# Patient Record
Sex: MALE | Race: WHITE | Employment: UNEMPLOYED | URBAN - METROPOLITAN AREA
[De-identification: names, ages, dates, MRNs, and addresses within clinical notes are randomized per-mention and may not be internally consistent; named-entity substitution may affect disease eponyms.]

---

## 2017-01-13 ENCOUNTER — OFFICE VISIT (OUTPATIENT)
Dept: FAMILY MEDICINE CLINIC | Age: 67
End: 2017-01-13

## 2017-01-13 VITALS
WEIGHT: 247.6 LBS | HEART RATE: 76 BPM | DIASTOLIC BLOOD PRESSURE: 78 MMHG | TEMPERATURE: 98 F | SYSTOLIC BLOOD PRESSURE: 118 MMHG | HEIGHT: 74 IN | RESPIRATION RATE: 12 BRPM | BODY MASS INDEX: 31.78 KG/M2

## 2017-01-13 DIAGNOSIS — J01.00 ACUTE MAXILLARY SINUSITIS, RECURRENCE NOT SPECIFIED: ICD-10-CM

## 2017-01-13 DIAGNOSIS — M15.9 PRIMARY OSTEOARTHRITIS INVOLVING MULTIPLE JOINTS: ICD-10-CM

## 2017-01-13 DIAGNOSIS — J20.9 ACUTE BRONCHITIS, UNSPECIFIED ORGANISM: Primary | ICD-10-CM

## 2017-01-13 DIAGNOSIS — I10 ESSENTIAL HYPERTENSION: ICD-10-CM

## 2017-01-13 PROCEDURE — 3017F COLORECTAL CA SCREEN DOC REV: CPT | Performed by: FAMILY MEDICINE

## 2017-01-13 PROCEDURE — G8427 DOCREV CUR MEDS BY ELIG CLIN: HCPCS | Performed by: FAMILY MEDICINE

## 2017-01-13 PROCEDURE — 1123F ACP DISCUSS/DSCN MKR DOCD: CPT | Performed by: FAMILY MEDICINE

## 2017-01-13 PROCEDURE — G8484 FLU IMMUNIZE NO ADMIN: HCPCS | Performed by: FAMILY MEDICINE

## 2017-01-13 PROCEDURE — 4040F PNEUMOC VAC/ADMIN/RCVD: CPT | Performed by: FAMILY MEDICINE

## 2017-01-13 PROCEDURE — G8419 CALC BMI OUT NRM PARAM NOF/U: HCPCS | Performed by: FAMILY MEDICINE

## 2017-01-13 PROCEDURE — 1036F TOBACCO NON-USER: CPT | Performed by: FAMILY MEDICINE

## 2017-01-13 PROCEDURE — 99213 OFFICE O/P EST LOW 20 MIN: CPT | Performed by: FAMILY MEDICINE

## 2017-01-13 RX ORDER — CEFUROXIME AXETIL 250 MG/1
250 TABLET ORAL 2 TIMES DAILY
Qty: 28 TABLET | Refills: 0 | Status: SHIPPED | OUTPATIENT
Start: 2017-01-13 | End: 2019-03-25 | Stop reason: SDUPTHER

## 2017-01-13 ASSESSMENT — ENCOUNTER SYMPTOMS
SHORTNESS OF BREATH: 0
RHINORRHEA: 1
SORE THROAT: 0
EYES NEGATIVE: 1
CONSTIPATION: 0
SINUS PAIN: 0
VOMITING: 0
COUGH: 1
ABDOMINAL PAIN: 0
WHEEZING: 0
DIARRHEA: 0
NAUSEA: 0
SWOLLEN GLANDS: 0

## 2017-01-13 ASSESSMENT — PATIENT HEALTH QUESTIONNAIRE - PHQ9
SUM OF ALL RESPONSES TO PHQ QUESTIONS 1-9: 0
SUM OF ALL RESPONSES TO PHQ9 QUESTIONS 1 & 2: 0
2. FEELING DOWN, DEPRESSED OR HOPELESS: 0
1. LITTLE INTEREST OR PLEASURE IN DOING THINGS: 0

## 2017-02-04 DIAGNOSIS — E78.5 HYPERLIPIDEMIA: ICD-10-CM

## 2017-02-04 RX ORDER — BENAZEPRIL HYDROCHLORIDE AND HYDROCHLOROTHIAZIDE 20; 12.5 MG/1; MG/1
TABLET ORAL
Qty: 180 TABLET | Refills: 1 | Status: SHIPPED | OUTPATIENT
Start: 2017-02-04 | End: 2017-08-05 | Stop reason: SDUPTHER

## 2017-06-09 ENCOUNTER — OFFICE VISIT (OUTPATIENT)
Dept: FAMILY MEDICINE CLINIC | Age: 67
End: 2017-06-09

## 2017-06-09 VITALS
WEIGHT: 248.6 LBS | TEMPERATURE: 96.8 F | SYSTOLIC BLOOD PRESSURE: 132 MMHG | RESPIRATION RATE: 16 BRPM | HEIGHT: 74 IN | DIASTOLIC BLOOD PRESSURE: 82 MMHG | HEART RATE: 84 BPM | BODY MASS INDEX: 31.9 KG/M2

## 2017-06-09 DIAGNOSIS — S22.32XA CLOSED FRACTURE OF ONE RIB OF LEFT SIDE, INITIAL ENCOUNTER: ICD-10-CM

## 2017-06-09 DIAGNOSIS — Z23 NEED FOR PNEUMOCOCCAL VACCINATION: ICD-10-CM

## 2017-06-09 DIAGNOSIS — R07.81 RIB PAIN ON LEFT SIDE: Primary | ICD-10-CM

## 2017-06-09 DIAGNOSIS — S20.212A CHEST WALL CONTUSION, LEFT, INITIAL ENCOUNTER: ICD-10-CM

## 2017-06-09 DIAGNOSIS — I10 ESSENTIAL HYPERTENSION: ICD-10-CM

## 2017-06-09 PROCEDURE — 90732 PPSV23 VACC 2 YRS+ SUBQ/IM: CPT | Performed by: FAMILY MEDICINE

## 2017-06-09 PROCEDURE — 99213 OFFICE O/P EST LOW 20 MIN: CPT | Performed by: FAMILY MEDICINE

## 2017-06-09 PROCEDURE — G8417 CALC BMI ABV UP PARAM F/U: HCPCS | Performed by: FAMILY MEDICINE

## 2017-06-09 PROCEDURE — 4040F PNEUMOC VAC/ADMIN/RCVD: CPT | Performed by: FAMILY MEDICINE

## 2017-06-09 PROCEDURE — 1036F TOBACCO NON-USER: CPT | Performed by: FAMILY MEDICINE

## 2017-06-09 PROCEDURE — 3017F COLORECTAL CA SCREEN DOC REV: CPT | Performed by: FAMILY MEDICINE

## 2017-06-09 PROCEDURE — G8427 DOCREV CUR MEDS BY ELIG CLIN: HCPCS | Performed by: FAMILY MEDICINE

## 2017-06-09 PROCEDURE — G0009 ADMIN PNEUMOCOCCAL VACCINE: HCPCS | Performed by: FAMILY MEDICINE

## 2017-06-09 PROCEDURE — 1123F ACP DISCUSS/DSCN MKR DOCD: CPT | Performed by: FAMILY MEDICINE

## 2017-06-09 ASSESSMENT — ENCOUNTER SYMPTOMS
CONSTIPATION: 0
DIARRHEA: 0
SHORTNESS OF BREATH: 0
EYES NEGATIVE: 1
NAUSEA: 0
ABDOMINAL PAIN: 0
COUGH: 0

## 2017-07-06 ENCOUNTER — OFFICE VISIT (OUTPATIENT)
Dept: FAMILY MEDICINE CLINIC | Age: 67
End: 2017-07-06

## 2017-07-06 VITALS
HEART RATE: 64 BPM | RESPIRATION RATE: 16 BRPM | SYSTOLIC BLOOD PRESSURE: 124 MMHG | WEIGHT: 249.6 LBS | BODY MASS INDEX: 32.03 KG/M2 | DIASTOLIC BLOOD PRESSURE: 82 MMHG | TEMPERATURE: 97.3 F | HEIGHT: 74 IN

## 2017-07-06 DIAGNOSIS — E78.2 MIXED HYPERLIPIDEMIA: ICD-10-CM

## 2017-07-06 DIAGNOSIS — M15.9 PRIMARY OSTEOARTHRITIS INVOLVING MULTIPLE JOINTS: ICD-10-CM

## 2017-07-06 DIAGNOSIS — S61.219A FINGER LACERATION, INITIAL ENCOUNTER: Primary | ICD-10-CM

## 2017-07-06 DIAGNOSIS — I10 ESSENTIAL HYPERTENSION: ICD-10-CM

## 2017-07-06 DIAGNOSIS — Z12.5 SPECIAL SCREENING FOR MALIGNANT NEOPLASM OF PROSTATE: ICD-10-CM

## 2017-07-06 PROCEDURE — 4040F PNEUMOC VAC/ADMIN/RCVD: CPT | Performed by: FAMILY MEDICINE

## 2017-07-06 PROCEDURE — 1036F TOBACCO NON-USER: CPT | Performed by: FAMILY MEDICINE

## 2017-07-06 PROCEDURE — 1123F ACP DISCUSS/DSCN MKR DOCD: CPT | Performed by: FAMILY MEDICINE

## 2017-07-06 PROCEDURE — 3017F COLORECTAL CA SCREEN DOC REV: CPT | Performed by: FAMILY MEDICINE

## 2017-07-06 PROCEDURE — 99213 OFFICE O/P EST LOW 20 MIN: CPT | Performed by: FAMILY MEDICINE

## 2017-07-06 PROCEDURE — G8427 DOCREV CUR MEDS BY ELIG CLIN: HCPCS | Performed by: FAMILY MEDICINE

## 2017-07-06 PROCEDURE — G8417 CALC BMI ABV UP PARAM F/U: HCPCS | Performed by: FAMILY MEDICINE

## 2017-07-06 RX ORDER — CEPHALEXIN 500 MG/1
CAPSULE ORAL
COMMUNITY
Start: 2017-07-01 | End: 2018-09-24 | Stop reason: ALTCHOICE

## 2017-07-06 ASSESSMENT — ENCOUNTER SYMPTOMS
SORE THROAT: 0
WHEEZING: 0
ABDOMINAL PAIN: 0
SHORTNESS OF BREATH: 0
VOMITING: 0
DIARRHEA: 0
COUGH: 0
SINUS PRESSURE: 0
NAUSEA: 0
CHEST TIGHTNESS: 0
RHINORRHEA: 0

## 2017-07-11 DIAGNOSIS — I10 ESSENTIAL HYPERTENSION: ICD-10-CM

## 2017-07-11 DIAGNOSIS — E78.2 MIXED HYPERLIPIDEMIA: ICD-10-CM

## 2017-07-11 DIAGNOSIS — Z12.5 SPECIAL SCREENING FOR MALIGNANT NEOPLASM OF PROSTATE: ICD-10-CM

## 2017-07-11 LAB
ALBUMIN SERPL-MCNC: 4.1 G/DL (ref 3.9–4.9)
ALP BLD-CCNC: 79 U/L (ref 35–104)
ALT SERPL-CCNC: 24 U/L (ref 0–41)
ANION GAP SERPL CALCULATED.3IONS-SCNC: 12 MEQ/L (ref 7–13)
AST SERPL-CCNC: 26 U/L (ref 0–40)
BASOPHILS ABSOLUTE: 0.1 K/UL (ref 0–0.2)
BASOPHILS RELATIVE PERCENT: 1.3 %
BILIRUB SERPL-MCNC: 0.4 MG/DL (ref 0–1.2)
BUN BLDV-MCNC: 15 MG/DL (ref 8–23)
CALCIUM SERPL-MCNC: 9.2 MG/DL (ref 8.6–10.2)
CHLORIDE BLD-SCNC: 100 MEQ/L (ref 98–107)
CHOLESTEROL, TOTAL: 204 MG/DL (ref 0–199)
CO2: 25 MEQ/L (ref 22–29)
CREAT SERPL-MCNC: 0.97 MG/DL (ref 0.7–1.2)
EOSINOPHILS ABSOLUTE: 0.1 K/UL (ref 0–0.7)
EOSINOPHILS RELATIVE PERCENT: 1.9 %
GFR AFRICAN AMERICAN: >60
GFR NON-AFRICAN AMERICAN: >60
GLOBULIN: 2.7 G/DL (ref 2.3–3.5)
GLUCOSE BLD-MCNC: 123 MG/DL (ref 74–109)
HCT VFR BLD CALC: 53 % (ref 42–52)
HDLC SERPL-MCNC: 44 MG/DL (ref 40–59)
HEMOGLOBIN: 16.9 G/DL (ref 14–18)
LDL CHOLESTEROL CALCULATED: 134 MG/DL (ref 0–129)
LYMPHOCYTES ABSOLUTE: 0.9 K/UL (ref 1–4.8)
LYMPHOCYTES RELATIVE PERCENT: 12.2 %
MCH RBC QN AUTO: 29.3 PG (ref 27–31.3)
MCHC RBC AUTO-ENTMCNC: 31.9 % (ref 33–37)
MCV RBC AUTO: 91.8 FL (ref 80–100)
MONOCYTES ABSOLUTE: 0.5 K/UL (ref 0.2–0.8)
MONOCYTES RELATIVE PERCENT: 7 %
NEUTROPHILS ABSOLUTE: 5.9 K/UL (ref 1.4–6.5)
NEUTROPHILS RELATIVE PERCENT: 77.6 %
PDW BLD-RTO: 16.4 % (ref 11.5–14.5)
PLATELET # BLD: 287 K/UL (ref 130–400)
POTASSIUM SERPL-SCNC: 4 MEQ/L (ref 3.5–5.1)
PROSTATE SPECIFIC ANTIGEN: 0.88 NG/ML (ref 0–5.4)
RBC # BLD: 5.78 M/UL (ref 4.7–6.1)
SODIUM BLD-SCNC: 137 MEQ/L (ref 132–144)
TOTAL PROTEIN: 6.8 G/DL (ref 6.4–8.1)
TRIGL SERPL-MCNC: 128 MG/DL (ref 0–200)
WBC # BLD: 7.7 K/UL (ref 4.8–10.8)

## 2017-08-05 DIAGNOSIS — E78.5 HYPERLIPIDEMIA: ICD-10-CM

## 2017-08-07 RX ORDER — BENAZEPRIL HYDROCHLORIDE AND HYDROCHLOROTHIAZIDE 20; 12.5 MG/1; MG/1
TABLET ORAL
Qty: 180 TABLET | Refills: 0 | Status: SHIPPED | OUTPATIENT
Start: 2017-08-07 | End: 2017-11-05 | Stop reason: SDUPTHER

## 2017-09-12 ENCOUNTER — OFFICE VISIT (OUTPATIENT)
Dept: FAMILY MEDICINE CLINIC | Age: 67
End: 2017-09-12

## 2017-09-12 VITALS
RESPIRATION RATE: 16 BRPM | HEIGHT: 74 IN | SYSTOLIC BLOOD PRESSURE: 114 MMHG | BODY MASS INDEX: 31.44 KG/M2 | HEART RATE: 64 BPM | DIASTOLIC BLOOD PRESSURE: 82 MMHG | TEMPERATURE: 96.3 F | WEIGHT: 245 LBS

## 2017-09-12 DIAGNOSIS — J20.9 ACUTE BRONCHITIS, UNSPECIFIED ORGANISM: Primary | ICD-10-CM

## 2017-09-12 DIAGNOSIS — I10 ESSENTIAL HYPERTENSION: ICD-10-CM

## 2017-09-12 DIAGNOSIS — J06.9 ACUTE UPPER RESPIRATORY INFECTION: ICD-10-CM

## 2017-09-12 PROCEDURE — 1036F TOBACCO NON-USER: CPT | Performed by: FAMILY MEDICINE

## 2017-09-12 PROCEDURE — 99213 OFFICE O/P EST LOW 20 MIN: CPT | Performed by: FAMILY MEDICINE

## 2017-09-12 PROCEDURE — 1123F ACP DISCUSS/DSCN MKR DOCD: CPT | Performed by: FAMILY MEDICINE

## 2017-09-12 PROCEDURE — 4040F PNEUMOC VAC/ADMIN/RCVD: CPT | Performed by: FAMILY MEDICINE

## 2017-09-12 PROCEDURE — 3017F COLORECTAL CA SCREEN DOC REV: CPT | Performed by: FAMILY MEDICINE

## 2017-09-12 PROCEDURE — G8417 CALC BMI ABV UP PARAM F/U: HCPCS | Performed by: FAMILY MEDICINE

## 2017-09-12 PROCEDURE — G8427 DOCREV CUR MEDS BY ELIG CLIN: HCPCS | Performed by: FAMILY MEDICINE

## 2017-09-12 RX ORDER — LEVOFLOXACIN 500 MG/1
500 TABLET, FILM COATED ORAL DAILY
Qty: 14 TABLET | Refills: 0 | Status: SHIPPED | OUTPATIENT
Start: 2017-09-12 | End: 2017-09-26

## 2017-09-12 ASSESSMENT — ENCOUNTER SYMPTOMS
SHORTNESS OF BREATH: 0
COUGH: 1
CONSTIPATION: 0
NAUSEA: 0
ABDOMINAL PAIN: 0
EYES NEGATIVE: 1
DIARRHEA: 0
SINUS PRESSURE: 1

## 2017-11-05 DIAGNOSIS — E78.5 HYPERLIPIDEMIA: ICD-10-CM

## 2017-11-06 RX ORDER — BENAZEPRIL HYDROCHLORIDE AND HYDROCHLOROTHIAZIDE 20; 12.5 MG/1; MG/1
TABLET ORAL
Qty: 180 TABLET | Refills: 1 | Status: SHIPPED | OUTPATIENT
Start: 2017-11-06 | End: 2018-05-05 | Stop reason: SDUPTHER

## 2018-05-05 DIAGNOSIS — E78.5 HYPERLIPIDEMIA: ICD-10-CM

## 2018-05-07 RX ORDER — BENAZEPRIL HYDROCHLORIDE AND HYDROCHLOROTHIAZIDE 20; 12.5 MG/1; MG/1
TABLET ORAL
Qty: 180 TABLET | Refills: 0 | Status: SHIPPED | OUTPATIENT
Start: 2018-05-07 | End: 2018-09-24 | Stop reason: SDUPTHER

## 2018-08-31 LAB
CHOLESTEROL, TOTAL: 137 MG/DL
CHOLESTEROL/HDL RATIO: NORMAL
HDLC SERPL-MCNC: 39 MG/DL (ref 35–70)
LDL CHOLESTEROL CALCULATED: 84 MG/DL (ref 0–160)
PROSTATE SPECIFIC ANTIGEN: 1.4 NG/ML
TRIGL SERPL-MCNC: 68 MG/DL
VLDLC SERPL CALC-MCNC: NORMAL MG/DL

## 2018-09-24 ENCOUNTER — OFFICE VISIT (OUTPATIENT)
Dept: FAMILY MEDICINE CLINIC | Age: 68
End: 2018-09-24
Payer: MEDICARE

## 2018-09-24 VITALS
SYSTOLIC BLOOD PRESSURE: 138 MMHG | BODY MASS INDEX: 30.01 KG/M2 | WEIGHT: 233.8 LBS | RESPIRATION RATE: 12 BRPM | HEART RATE: 63 BPM | TEMPERATURE: 96.7 F | DIASTOLIC BLOOD PRESSURE: 86 MMHG | HEIGHT: 74 IN

## 2018-09-24 DIAGNOSIS — M77.9 TENDINITIS: ICD-10-CM

## 2018-09-24 DIAGNOSIS — I10 ESSENTIAL HYPERTENSION: Primary | ICD-10-CM

## 2018-09-24 DIAGNOSIS — Z12.5 SPECIAL SCREENING FOR MALIGNANT NEOPLASM OF PROSTATE: ICD-10-CM

## 2018-09-24 DIAGNOSIS — Z23 NEED FOR SHINGLES VACCINE: ICD-10-CM

## 2018-09-24 DIAGNOSIS — I10 ESSENTIAL HYPERTENSION: ICD-10-CM

## 2018-09-24 DIAGNOSIS — E78.2 MIXED HYPERLIPIDEMIA: ICD-10-CM

## 2018-09-24 DIAGNOSIS — E78.00 PURE HYPERCHOLESTEROLEMIA: ICD-10-CM

## 2018-09-24 LAB
ALBUMIN SERPL-MCNC: 4 G/DL (ref 3.9–4.9)
ALP BLD-CCNC: 83 U/L (ref 35–104)
ALT SERPL-CCNC: 23 U/L (ref 0–41)
ANION GAP SERPL CALCULATED.3IONS-SCNC: 16 MEQ/L (ref 7–13)
AST SERPL-CCNC: 27 U/L (ref 0–40)
BASOPHILS ABSOLUTE: 0.1 K/UL (ref 0–0.2)
BASOPHILS RELATIVE PERCENT: 0.5 %
BILIRUB SERPL-MCNC: 0.7 MG/DL (ref 0–1.2)
BUN BLDV-MCNC: 15 MG/DL (ref 8–23)
CALCIUM SERPL-MCNC: 9.5 MG/DL (ref 8.6–10.2)
CHLORIDE BLD-SCNC: 96 MEQ/L (ref 98–107)
CO2: 27 MEQ/L (ref 22–29)
CREAT SERPL-MCNC: 1.02 MG/DL (ref 0.7–1.2)
EOSINOPHILS ABSOLUTE: 0.1 K/UL (ref 0–0.7)
EOSINOPHILS RELATIVE PERCENT: 1.3 %
GFR AFRICAN AMERICAN: >60
GFR NON-AFRICAN AMERICAN: >60
GLOBULIN: 3.1 G/DL (ref 2.3–3.5)
GLUCOSE BLD-MCNC: 107 MG/DL (ref 74–109)
HCT VFR BLD CALC: 61.3 % (ref 42–52)
HEMOGLOBIN: 20.6 G/DL (ref 14–18)
LYMPHOCYTES ABSOLUTE: 0.6 K/UL (ref 1–4.8)
LYMPHOCYTES RELATIVE PERCENT: 7 %
MCH RBC QN AUTO: 31.1 PG (ref 27–31.3)
MCHC RBC AUTO-ENTMCNC: 33.5 % (ref 33–37)
MCV RBC AUTO: 92.7 FL (ref 80–100)
MONOCYTES ABSOLUTE: 0.7 K/UL (ref 0.2–0.8)
MONOCYTES RELATIVE PERCENT: 7.6 %
NEUTROPHILS ABSOLUTE: 7.7 K/UL (ref 1.4–6.5)
NEUTROPHILS RELATIVE PERCENT: 83.6 %
PDW BLD-RTO: 18.1 % (ref 11.5–14.5)
PLATELET # BLD: 253 K/UL (ref 130–400)
POTASSIUM SERPL-SCNC: 3.9 MEQ/L (ref 3.5–5.1)
RBC # BLD: 6.61 M/UL (ref 4.7–6.1)
SODIUM BLD-SCNC: 139 MEQ/L (ref 132–144)
TOTAL PROTEIN: 7.1 G/DL (ref 6.4–8.1)
WBC # BLD: 9.3 K/UL (ref 4.8–10.8)

## 2018-09-24 PROCEDURE — 99214 OFFICE O/P EST MOD 30 MIN: CPT | Performed by: FAMILY MEDICINE

## 2018-09-24 RX ORDER — BENAZEPRIL HYDROCHLORIDE AND HYDROCHLOROTHIAZIDE 20; 12.5 MG/1; MG/1
TABLET ORAL
Qty: 180 TABLET | Refills: 3 | Status: SHIPPED | OUTPATIENT
Start: 2018-09-24 | End: 2018-10-01 | Stop reason: SDUPTHER

## 2018-09-24 ASSESSMENT — PATIENT HEALTH QUESTIONNAIRE - PHQ9
1. LITTLE INTEREST OR PLEASURE IN DOING THINGS: 0
SUM OF ALL RESPONSES TO PHQ9 QUESTIONS 1 & 2: 0
2. FEELING DOWN, DEPRESSED OR HOPELESS: 0
SUM OF ALL RESPONSES TO PHQ QUESTIONS 1-9: 0
SUM OF ALL RESPONSES TO PHQ QUESTIONS 1-9: 0

## 2018-09-24 ASSESSMENT — ENCOUNTER SYMPTOMS
COUGH: 0
SHORTNESS OF BREATH: 0
NAUSEA: 0
DIARRHEA: 0
CONSTIPATION: 0
ABDOMINAL PAIN: 0
EYES NEGATIVE: 1

## 2018-09-24 NOTE — PROGRESS NOTES
Subjective  Talyor Hendrickson, 76 y.o. male presents today with:  Chief Complaint   Patient presents with    Hypertension         Arthritis         Wrist Pain                HPI     Patient presents today for a follow-up on Hypertension. Tries to follow a low sodium diet. Is fasting for blood work. Also complaining of ongoing shoulder pain. It has worsened over the past 2 months. Denies injury, or swelling. Has not tried any medication for it. Thinks it's Arthritis. Denies any fall       States he has a vein in his left wrist that becomes tender, and swollen. It is waxing, and waning. Present for 6 months. Not there most of the time. It is not there now      Had [employer] screening done which was reviewed with the patient  PSA and lipids were all okay. All her tests were okay  Taking current medications which were reviewed. Problem list discussed. Eating much better. Losing weight. Says he feels so much better. Overall doing well. Has No other new problem / question. Health Maintenance Is Up-To-Date          No other questions and or concerns for today's visit      Review of Systems   Constitutional: Negative for activity change, appetite change, fatigue and fever. HENT: Negative for ear pain. Eyes: Negative. Respiratory: Negative for cough and shortness of breath. Cardiovascular: Negative for chest pain and palpitations. Gastrointestinal: Negative for abdominal pain, constipation, diarrhea and nausea. Genitourinary: Negative for dysuria and frequency. Neurological: Negative for dizziness and headaches.          Past Medical History:   Diagnosis Date    Eczema     Hyperlipidemia     Hypertension     Osteoarthritis     Overweight(278.02)      Past Surgical History:   Procedure Laterality Date    COLONOSCOPY  10/15/15    DR HOLLIS   5 YRS    KNEE SURGERY      left    TONSILLECTOMY       Social History     Social History    Marital status:      Spouse name: [id number] Number of children: 3    Years of education: N/A     Occupational History     Retired     Social History Main Topics    Smoking status: Never Smoker    Smokeless tobacco: Never Used    Alcohol use No    Drug use: No    Sexual activity: Not on file     Other Topics Concern    Not on file     Social History Narrative    No narrative on file     Family History   Problem Relation Age of Onset    Heart Attack Father      Allergies   Allergen Reactions    Bee Venom Shortness Of Breath     Throat swells     Current Outpatient Prescriptions   Medication Sig Dispense Refill    benazepril-hydrochlorthiazide (LOTENSIN HCT) 20-12.5 MG per tablet TAKE 1 TABLET TWICE A  tablet 3    zoster recombinant adjuvanted vaccine (SHINGRIX) 50 MCG SUSR injection Inject 0.5 mLs into the muscle once for 1 dose 0.5 mL 1    Handicap Placard MISC by Does not apply route Expires x 5 years 07/06/2022 1 each 0    Multiple Vitamin (THERA) TABS Take  by mouth. No current facility-administered medications for this visit. PMH, Surgical Hx, Family Hx, and Social Hx reviewed and updated. Health Maintenance reviewed. Objective    Vitals:    09/24/18 0833   BP: 138/86   Pulse: 63   Resp: 12   Temp: 96.7 °F (35.9 °C)   TempSrc: Temporal   Weight: 233 lb 12.8 oz (106.1 kg)   Height: 6' 2\" (1.88 m)     Physical Exam   Constitutional: He appears well-nourished. No distress. HENT:   Head: Normocephalic. Right Ear: External ear normal.   Left Ear: External ear normal.   Nose: Nose normal.   Mouth/Throat: Oropharynx is clear and moist.   Eyes: Pupils are equal, round, and reactive to light. Conjunctivae are normal.   Neck: Neck supple. Carotid bruit is not present. No thyromegaly present. Cardiovascular: Normal rate, regular rhythm, normal heart sounds and normal pulses. No murmur heard. Pulmonary/Chest: Effort normal and breath sounds normal. He has no wheezes. Abdominal: Soft.  Bowel

## 2018-09-24 NOTE — PATIENT INSTRUCTIONS
Hypertension / Hyperlipidemia Management    Blood Pressure Log      Tips to manage your condition    1. Keep your blood pressure below 130/80   Make sure your blood pressure is measured at every office visit    2. If you take antihypertensive drug therapy return for follow-up monthly until B/P goal is reached. 3. Follow-up with your physician to have your potassium and creatinine measured every 6 months. 4. Measure your blood pressure at home (use log to track your progress). 5. Keep your LDL (bad) cholesterol level below 130   Make sure your lipids are measured once a year     6. If you take LDL-lowering drug therapy return for follow-up every 6 months    Tips for healthy living    1. Start your day with breakfast  2. Exercise and slowly progress to (brisk walking, bike riding, etc) 30 minutes 3 to 5 days a week. 3. Snack in moderation (limit eating sugary or salty foods to no more than 3 times a week). 4. Eat more grains and vegetables (have no more than 3 servings of fruit daily). 5. Avoid tobacco use. 6. Drink alcohol in moderation (no more than 1 serving daily for woman and no more than 2 servings daily for men)  7. Obtain annual flu shot  8. Refer to patient education handouts given to you today. [facility] Address Phone Website   [facility]  Dept. [address] [place] [address], [address] [place] [address] [phone] [email or web address]       Weight Management Community Resources   Organization Address Phone Website   [facility] [address] [facility], [address] [place] [address] [phone] n/a   [employer] Multiple meeting locations throughout [address] [email or web address]     Tops n/a n/a [email or web address]. [email or web address] [address] [place]  Laila Harish Chopra, [address] [phone] [email or web address]    [facility] [address] [place] [address] [facility] [phone]

## 2018-09-27 ENCOUNTER — TELEPHONE (OUTPATIENT)
Dept: FAMILY MEDICINE CLINIC | Age: 68
End: 2018-09-27

## 2018-09-27 DIAGNOSIS — D58.2 ELEVATED HEMOGLOBIN (HCC): Primary | ICD-10-CM

## 2018-10-01 ENCOUNTER — TELEPHONE (OUTPATIENT)
Dept: FAMILY MEDICINE CLINIC | Age: 68
End: 2018-10-01

## 2018-10-01 DIAGNOSIS — D58.2 ELEVATED HEMOGLOBIN (HCC): ICD-10-CM

## 2018-10-01 DIAGNOSIS — E78.00 PURE HYPERCHOLESTEROLEMIA: ICD-10-CM

## 2018-10-01 LAB
ANISOCYTOSIS: ABNORMAL
BASOPHILS ABSOLUTE: 0.1 K/UL (ref 0–0.2)
BASOPHILS RELATIVE PERCENT: 0.7 %
EOSINOPHILS ABSOLUTE: 0.1 K/UL (ref 0–0.7)
EOSINOPHILS RELATIVE PERCENT: 1.5 %
HCT VFR BLD CALC: 56.4 % (ref 42–52)
HEMOGLOBIN: 18.6 G/DL (ref 14–18)
LYMPHOCYTES ABSOLUTE: 0.7 K/UL (ref 1–4.8)
LYMPHOCYTES RELATIVE PERCENT: 8 %
MCH RBC QN AUTO: 30.9 PG (ref 27–31.3)
MCHC RBC AUTO-ENTMCNC: 33 % (ref 33–37)
MCV RBC AUTO: 93.7 FL (ref 80–100)
MONOCYTES ABSOLUTE: 0.6 K/UL (ref 0.2–0.8)
MONOCYTES RELATIVE PERCENT: 6.5 %
NEUTROPHILS ABSOLUTE: 7.1 K/UL (ref 1.4–6.5)
NEUTROPHILS RELATIVE PERCENT: 83.3 %
PDW BLD-RTO: 18 % (ref 11.5–14.5)
PLATELET # BLD: 235 K/UL (ref 130–400)
POIKILOCYTES: ABNORMAL
RBC # BLD: 6.02 M/UL (ref 4.7–6.1)
WBC # BLD: 8.5 K/UL (ref 4.8–10.8)

## 2018-10-01 RX ORDER — BENAZEPRIL HYDROCHLORIDE AND HYDROCHLOROTHIAZIDE 20; 12.5 MG/1; MG/1
TABLET ORAL
Qty: 180 TABLET | Refills: 0 | Status: SHIPPED | OUTPATIENT
Start: 2018-10-01 | End: 2018-10-08 | Stop reason: SDUPTHER

## 2018-10-01 NOTE — TELEPHONE ENCOUNTER
States that he would like to have the medication sent to [facility]  Medication is pending

## 2018-10-08 DIAGNOSIS — E78.00 PURE HYPERCHOLESTEROLEMIA: ICD-10-CM

## 2018-10-08 RX ORDER — BENAZEPRIL HYDROCHLORIDE AND HYDROCHLOROTHIAZIDE 20; 12.5 MG/1; MG/1
TABLET ORAL
Qty: 180 TABLET | Refills: 0 | Status: SHIPPED | OUTPATIENT
Start: 2018-10-08

## 2019-03-25 ENCOUNTER — OFFICE VISIT (OUTPATIENT)
Dept: FAMILY MEDICINE CLINIC | Age: 69
End: 2019-03-25
Payer: MEDICARE

## 2019-03-25 VITALS
TEMPERATURE: 97.4 F | BODY MASS INDEX: 31.06 KG/M2 | DIASTOLIC BLOOD PRESSURE: 72 MMHG | HEART RATE: 56 BPM | WEIGHT: 242 LBS | RESPIRATION RATE: 12 BRPM | HEIGHT: 74 IN | SYSTOLIC BLOOD PRESSURE: 108 MMHG

## 2019-03-25 DIAGNOSIS — H65.92 SEROMUCINOUS OTITIS MEDIA OF LEFT EAR: Primary | ICD-10-CM

## 2019-03-25 DIAGNOSIS — D75.1 POLYCYTHEMIA: ICD-10-CM

## 2019-03-25 PROCEDURE — 99213 OFFICE O/P EST LOW 20 MIN: CPT | Performed by: FAMILY MEDICINE

## 2019-03-25 RX ORDER — CEFUROXIME AXETIL 250 MG/1
250 TABLET ORAL 2 TIMES DAILY
Qty: 14 TABLET | Refills: 0 | Status: SHIPPED | OUTPATIENT
Start: 2019-03-25 | End: 2019-04-01

## 2019-03-25 ASSESSMENT — ENCOUNTER SYMPTOMS
ABDOMINAL PAIN: 0
NAUSEA: 0
EYES NEGATIVE: 1
SHORTNESS OF BREATH: 0
COUGH: 0
CONSTIPATION: 0
DIARRHEA: 0

## 2019-03-25 ASSESSMENT — PATIENT HEALTH QUESTIONNAIRE - PHQ9
2. FEELING DOWN, DEPRESSED OR HOPELESS: 1
SUM OF ALL RESPONSES TO PHQ QUESTIONS 1-9: 2
SUM OF ALL RESPONSES TO PHQ QUESTIONS 1-9: 2
1. LITTLE INTEREST OR PLEASURE IN DOING THINGS: 1
SUM OF ALL RESPONSES TO PHQ9 QUESTIONS 1 & 2: 2

## 2021-05-23 ENCOUNTER — OFFICE VISIT (OUTPATIENT)
Dept: INTERNAL MEDICINE | Age: 71
End: 2021-05-23
Payer: MEDICARE

## 2021-05-23 VITALS
BODY MASS INDEX: 29.8 KG/M2 | HEIGHT: 74 IN | HEART RATE: 86 BPM | TEMPERATURE: 97.2 F | SYSTOLIC BLOOD PRESSURE: 100 MMHG | DIASTOLIC BLOOD PRESSURE: 64 MMHG | WEIGHT: 232.2 LBS | OXYGEN SATURATION: 98 %

## 2021-05-23 DIAGNOSIS — T16.2XXA FOREIGN BODY OF LEFT EAR, INITIAL ENCOUNTER: Primary | ICD-10-CM

## 2021-05-23 PROCEDURE — 10120 INC&RMVL FB SUBQ TISS SMPL: CPT | Performed by: NURSE PRACTITIONER

## 2021-05-23 PROCEDURE — 99203 OFFICE O/P NEW LOW 30 MIN: CPT | Performed by: NURSE PRACTITIONER

## 2021-05-23 ASSESSMENT — ENCOUNTER SYMPTOMS
EYE ITCHING: 0
WHEEZING: 0
SINUS PRESSURE: 0
RHINORRHEA: 0
VOMITING: 0
EYE REDNESS: 0
DIARRHEA: 0
EYE DISCHARGE: 0
COUGH: 0
SHORTNESS OF BREATH: 0
NAUSEA: 0

## 2021-05-23 ASSESSMENT — PATIENT HEALTH QUESTIONNAIRE - PHQ9
SUM OF ALL RESPONSES TO PHQ QUESTIONS 1-9: 0
SUM OF ALL RESPONSES TO PHQ QUESTIONS 1-9: 0

## 2021-05-23 ASSESSMENT — SOCIAL DETERMINANTS OF HEALTH (SDOH): HOW HARD IS IT FOR YOU TO PAY FOR THE VERY BASICS LIKE FOOD, HOUSING, MEDICAL CARE, AND HEATING?: NOT HARD AT ALL

## 2021-05-23 NOTE — PROGRESS NOTES
moist.      Pharynx: Oropharynx is clear. No posterior oropharyngeal erythema. Cardiovascular:      Rate and Rhythm: Normal rate and regular rhythm. Heart sounds: Normal heart sounds, S1 normal and S2 normal.   Pulmonary:      Effort: Pulmonary effort is normal. No respiratory distress. Breath sounds: Normal air entry. Skin:     General: Skin is warm and dry. Neurological:      Mental Status: He is alert and oriented to person, place, and time. Psychiatric:         Mood and Affect: Mood normal.         Behavior: Behavior is cooperative. An electronic signature was used to authenticate this note.     --TROY Pike
Implemented All Fall with Harm Risk Interventions:  [place] to call system. Call bell, personal items and telephone within reach. Instruct patient to call for assistance. Room bathroom lighting operational. Non-slip footwear when patient is off stretcher. Physically safe environment: no spills, clutter or unnecessary equipment. Stretcher in lowest position, wheels locked, appropriate side rails in place. Provide visual cue, wrist band, yellow gown, etc. Monitor gait and stability. Monitor for mental status changes and reorient to person, place, and time. Review medications for side effects contributing to fall risk. Reinforce activity limits and safety measures with patient and family. Provide visual clues: red socks.

## 2021-05-23 NOTE — PATIENT INSTRUCTIONS
important to keep the liquid in the ear canal for 3 to 5 minutes. · You can put heat on the ear to relieve pain. Use a warm washcloth or a heating pad set on low. · Do not put cotton swabs, jane pins, or other objects in the ear. Do not put any liquids in the ear, unless your doctor directs you to. When should you call for help? Call your doctor now or seek immediate medical care if:    · You have symptoms of an ear infection, such as:  ? You have new or worse pain, swelling, warmth, or redness around or behind your ear.  ? You have a fever with a stiff neck or severe headache. Watch closely for changes in your health, and be sure to contact your doctor if:    · You are not getting better after 2 days (48 hours).     · You have new or worse symptoms. Where can you learn more? Go to https://NeovacspeGEOCOMtms.[employer]. org and sign in to your [employer] account. Enter C171 in the [employer] box to learn more about \"Object in the Ear: Care Instructions. \"     If you do not have an account, please click on the \"Sign Up Now\" link. Current as of: February 26, 2020               Content Version: 12.8  © 2006-2021 Healthwise, [employer]. Care instructions adapted under license by [facility] [address] If you have questions about a medical condition or this instruction, always ask your healthcare professional. [place] [address] any warranty or liability for your use of this information.

## 2023-02-21 ENCOUNTER — OFFICE VISIT (OUTPATIENT)
Dept: FAMILY MEDICINE CLINIC | Age: 73
End: 2023-02-21
Payer: MEDICARE

## 2023-02-21 VITALS
SYSTOLIC BLOOD PRESSURE: 110 MMHG | TEMPERATURE: 97.8 F | HEART RATE: 75 BPM | BODY MASS INDEX: 27.44 KG/M2 | DIASTOLIC BLOOD PRESSURE: 64 MMHG | WEIGHT: 213.8 LBS | HEIGHT: 74 IN | OXYGEN SATURATION: 98 %

## 2023-02-21 DIAGNOSIS — L02.612 ABSCESS OF LEFT FOOT: Primary | ICD-10-CM

## 2023-02-21 PROCEDURE — 1036F TOBACCO NON-USER: CPT

## 2023-02-21 PROCEDURE — G8427 DOCREV CUR MEDS BY ELIG CLIN: HCPCS

## 2023-02-21 PROCEDURE — 1123F ACP DISCUSS/DSCN MKR DOCD: CPT

## 2023-02-21 PROCEDURE — 3078F DIAST BP <80 MM HG: CPT

## 2023-02-21 PROCEDURE — G8417 CALC BMI ABV UP PARAM F/U: HCPCS

## 2023-02-21 PROCEDURE — 3017F COLORECTAL CA SCREEN DOC REV: CPT

## 2023-02-21 PROCEDURE — G8484 FLU IMMUNIZE NO ADMIN: HCPCS

## 2023-02-21 PROCEDURE — 3074F SYST BP LT 130 MM HG: CPT

## 2023-02-21 PROCEDURE — 99213 OFFICE O/P EST LOW 20 MIN: CPT

## 2023-02-21 RX ORDER — LISINOPRIL 20 MG/1
20 TABLET ORAL DAILY
COMMUNITY

## 2023-02-21 RX ORDER — CEPHALEXIN 500 MG/1
500 CAPSULE ORAL 4 TIMES DAILY
Qty: 40 CAPSULE | Refills: 0 | Status: SHIPPED | OUTPATIENT
Start: 2023-02-21 | End: 2023-03-03

## 2023-02-21 RX ORDER — ATORVASTATIN CALCIUM 20 MG/1
TABLET, FILM COATED ORAL
COMMUNITY
Start: 2021-01-06

## 2023-02-21 RX ORDER — HYDROXYUREA 500 MG/1
CAPSULE ORAL DAILY
COMMUNITY

## 2023-02-21 SDOH — ECONOMIC STABILITY: INCOME INSECURITY: HOW HARD IS IT FOR YOU TO PAY FOR THE VERY BASICS LIKE FOOD, HOUSING, MEDICAL CARE, AND HEATING?: NOT HARD AT ALL

## 2023-02-21 SDOH — ECONOMIC STABILITY: FOOD INSECURITY: WITHIN THE PAST 12 MONTHS, THE FOOD YOU BOUGHT JUST DIDN'T LAST AND YOU DIDN'T HAVE MONEY TO GET MORE.: NEVER TRUE

## 2023-02-21 SDOH — ECONOMIC STABILITY: FOOD INSECURITY: WITHIN THE PAST 12 MONTHS, YOU WORRIED THAT YOUR FOOD WOULD RUN OUT BEFORE YOU GOT MONEY TO BUY MORE.: NEVER TRUE

## 2023-02-21 SDOH — ECONOMIC STABILITY: HOUSING INSECURITY
IN THE LAST 12 MONTHS, WAS THERE A TIME WHEN YOU DID NOT HAVE A STEADY PLACE TO SLEEP OR SLEPT IN A SHELTER (INCLUDING NOW)?: NO

## 2023-02-21 ASSESSMENT — ENCOUNTER SYMPTOMS
COLOR CHANGE: 0
SHORTNESS OF BREATH: 0
COUGH: 0

## 2023-02-21 ASSESSMENT — PATIENT HEALTH QUESTIONNAIRE - PHQ9
SUM OF ALL RESPONSES TO PHQ9 QUESTIONS 1 & 2: 1
SUM OF ALL RESPONSES TO PHQ QUESTIONS 1-9: 1
1. LITTLE INTEREST OR PLEASURE IN DOING THINGS: 0
SUM OF ALL RESPONSES TO PHQ QUESTIONS 1-9: 1
SUM OF ALL RESPONSES TO PHQ QUESTIONS 1-9: 1
2. FEELING DOWN, DEPRESSED OR HOPELESS: 1
SUM OF ALL RESPONSES TO PHQ QUESTIONS 1-9: 1

## 2023-02-21 NOTE — PROGRESS NOTES
Subjective  Angie Lau, 67 y.o. male presents today with:  Chief Complaint   Patient presents with    Foot Injury     Ammo can slipped and fell on left foot x 1 week       Foot Injury   The incident occurred 5 to 7 days ago (Can fell on foot one week ago but pt states he is not concerned for a possible fracture. No trouble with ambulation. Pt is concerned about infection to area. States the swelling has caused the top of his foot to rub against shoe causing skin irritation. ). The incident occurred at home. The injury mechanism was a direct blow (Pt states a can fell on his foot one week ago but did not break the skin.). The pain is present in the left foot. Quality: sore. The pain is at a severity of 1/10. The pain is mild. The pain has been Fluctuating since onset. Pertinent negatives include no inability to bear weight, loss of motion, loss of sensation, muscle weakness, numbness or tingling. He reports no foreign bodies present. The symptoms are aggravated by palpation. He has tried elevation, heat and ice for the symptoms. The treatment provided mild relief. Review of Systems   Constitutional:  Negative for chills, fatigue and fever. Respiratory:  Negative for cough and shortness of breath. Cardiovascular:  Negative for chest pain and palpitations. Musculoskeletal:  Positive for myalgias. Negative for arthralgias and joint swelling. Skin:  Positive for wound. Negative for color change and pallor. Neurological:  Negative for dizziness, tingling, weakness, light-headedness, numbness and headaches. PMH, Surgical Hx, Family Hx, and Social Hx reviewed and updated.       Objective  Vitals:    02/21/23 0943   BP: 110/64   Pulse: 75   Temp: 97.8 °F (36.6 °C)   TempSrc: Infrared   SpO2: 98%   Weight: 213 lb 12.8 oz (97 kg)   Height: 6' 2\" (1.88 m)     BP Readings from Last 3 Encounters:   02/21/23 110/64   05/23/21 100/64   03/25/19 108/72     Wt Readings from Last 3 Encounters: 02/21/23 213 lb 12.8 oz (97 kg)   05/23/21 232 lb 3.2 oz (105.3 kg)   03/25/19 242 lb (109.8 kg)     Physical Exam  Vitals reviewed. Exam conducted with a chaperone present (wife with patient). Constitutional:       General: He is not in acute distress. Appearance: Normal appearance. He is not ill-appearing. HENT:      Head: Normocephalic and atraumatic. Eyes:      General: Lids are normal.   Cardiovascular:      Rate and Rhythm: Normal rate and regular rhythm. Pulmonary:      Effort: Pulmonary effort is normal. No respiratory distress. Breath sounds: Normal air entry. Musculoskeletal:      Cervical back: Normal range of motion. Left foot: Normal range of motion. Feet:    Feet:      Left foot:      Skin integrity: Skin breakdown, erythema and warmth present. Skin:     General: Skin is warm and dry. Neurological:      Mental Status: He is alert and oriented to person, place, and time. Mental status is at baseline. Psychiatric:         Mood and Affect: Mood normal.         Behavior: Behavior is cooperative. Assessment & Plan   1. Abscess of left foot  -     cephALEXin (KEFLEX) 500 MG capsule; Take 1 capsule by mouth 4 times daily for 10 days, Disp-40 capsule, R-0Normal  -     mupirocin (BACTROBAN) 2 % ointment; Apply topically 3 times daily for 10 days. , Disp-22 g, R-0, Normal   -Warm compresses 15-30 minutes 3-4 times per day. -Elevate foot whenever possible  -Do not attempt to drain abscess prematurely by squeezing aggressively  -Keep area clean and dry  -OTC medication for symptom control such as tylenol   -Discussed at length when to go to hospital or return to clinic/ see PCP.  Discussed signs and symptoms of worsening infection.  -Discussed red flags for when to go to ER       Orders Placed This Encounter   Medications    cephALEXin (KEFLEX) 500 MG capsule     Sig: Take 1 capsule by mouth 4 times daily for 10 days     Dispense:  40 capsule     Refill:  0    mupirocin (BACTROBAN) 2 % ointment     Sig: Apply topically 3 times daily for 10 days. Dispense:  22 g     Refill:  0     Return in 2 days (on 2/23/2023) for follow up with PCP. Reviewed with the patient: current clinical status,medications, activities and diet. Side effects, adverse effects of the medication prescribed today, as well as treatment plan/ rationale and result expectations have been discussed with the patient who expresses understanding and desires to proceed. Close follow up to evaluate treatment results and for coordination of care. I have reviewed the patient's medical history in detail and updated the computerized patient record.     TROY Dubose NP

## 2024-09-12 ENCOUNTER — OFFICE VISIT (OUTPATIENT)
Dept: ORTHOPEDIC SURGERY | Facility: CLINIC | Age: 74
End: 2024-09-12
Payer: MEDICARE

## 2024-09-12 ENCOUNTER — HOSPITAL ENCOUNTER (OUTPATIENT)
Dept: RADIOLOGY | Facility: CLINIC | Age: 74
Discharge: HOME | End: 2024-09-12
Payer: MEDICARE

## 2024-09-12 VITALS — BODY MASS INDEX: 25.62 KG/M2 | WEIGHT: 199.6 LBS | HEIGHT: 74 IN

## 2024-09-12 DIAGNOSIS — M25.562 LEFT KNEE PAIN, UNSPECIFIED CHRONICITY: ICD-10-CM

## 2024-09-12 PROCEDURE — 1159F MED LIST DOCD IN RCRD: CPT | Performed by: ORTHOPAEDIC SURGERY

## 2024-09-12 PROCEDURE — 99204 OFFICE O/P NEW MOD 45 MIN: CPT | Performed by: ORTHOPAEDIC SURGERY

## 2024-09-12 PROCEDURE — 1036F TOBACCO NON-USER: CPT | Performed by: ORTHOPAEDIC SURGERY

## 2024-09-12 PROCEDURE — 73564 X-RAY EXAM KNEE 4 OR MORE: CPT | Mod: LT

## 2024-09-12 PROCEDURE — 3008F BODY MASS INDEX DOCD: CPT | Performed by: ORTHOPAEDIC SURGERY

## 2024-09-12 PROCEDURE — 99214 OFFICE O/P EST MOD 30 MIN: CPT | Performed by: ORTHOPAEDIC SURGERY

## 2024-09-12 RX ORDER — ATORVASTATIN CALCIUM 40 MG/1
20 TABLET, FILM COATED ORAL
COMMUNITY

## 2024-09-12 RX ORDER — METHYLPREDNISOLONE 4 MG/1
TABLET ORAL
Qty: 21 TABLET | Refills: 0 | Status: SHIPPED | OUTPATIENT
Start: 2024-09-12

## 2024-09-12 RX ORDER — MELOXICAM 15 MG/1
15 TABLET ORAL
Qty: 30 TABLET | Refills: 1 | Status: SHIPPED | OUTPATIENT
Start: 2024-09-12

## 2024-09-12 RX ORDER — VIT A/VIT C/VIT E/ZINC/COPPER 4296-226
1 CAPSULE ORAL 2 TIMES DAILY
COMMUNITY

## 2024-09-12 RX ORDER — LISINOPRIL 20 MG/1
20 TABLET ORAL DAILY
COMMUNITY

## 2024-09-12 RX ORDER — HYDROXYUREA 500 MG/1
CAPSULE ORAL DAILY
COMMUNITY

## 2024-09-12 ASSESSMENT — PATIENT HEALTH QUESTIONNAIRE - PHQ9
1. LITTLE INTEREST OR PLEASURE IN DOING THINGS: NOT AT ALL
SUM OF ALL RESPONSES TO PHQ9 QUESTIONS 1 AND 2: 0
2. FEELING DOWN, DEPRESSED OR HOPELESS: NOT AT ALL

## 2024-09-12 ASSESSMENT — ENCOUNTER SYMPTOMS
LOSS OF SENSATION IN FEET: 0
DEPRESSION: 0
OCCASIONAL FEELINGS OF UNSTEADINESS: 1

## 2024-09-12 NOTE — PROGRESS NOTES
History of Present Illness:  Patient presents with left knee pain.  The patient localizes the pain diffusely.  Recently there has been concern for falls and instability.  There is increasing difficulty with activities of daily living and significant disability related to the knee pain.    He has a history of TKA with garcia, revision with saul and subsequent revision with Helga. He endorses chronic mild to moderate pain and instability worse at the end of the day.    Retired , likes to fish and play put put.      Review of Systems   GENERAL: Negative for malaise, significant weight loss, fever  MUSCULOSKELETAL: see HPI  NEURO:  Negative    History reviewed. No pertinent past medical history.  History reviewed. No pertinent surgical history.    Current Outpatient Medications:     apixaban (Eliquis) 5 mg tablet, Take by mouth 2 times a day., Disp: , Rfl:     atorvastatin (Lipitor) 40 mg tablet, Take 0.5 tablets (20 mg) by mouth once daily., Disp: , Rfl:     hydroxyurea (Hydrea) 500 mg capsule, Take by mouth once daily., Disp: , Rfl:     lisinopril 20 mg tablet, Take 1 tablet (20 mg) by mouth once daily., Disp: , Rfl:     vitamins A,C,E-zinc-copper (PreserVision AREDS) 4,296 mcg-226 mg-90 mg capsule, Take 1 capsule by mouth twice a day., Disp: , Rfl:       Physical Examination:  Left Knee:  Skin healthy and intact, incisions healed  No gross swelling or ecchymosis  Alignment: neutral      Effusion: mild       ROM: 0-95  No pain with internal rotation of the hip  Tenderness to palpation over medial and lateral joint line and with patellar compression     No laxity to valgus stress  No laxity to varus stress  Neurovascular exam normal distally  2+ DP pulse and good cap refill    Radiographs:  Stemmed cemented revision TKA, no evidence of lucency or failure     Assessment:  Patient status post revision TKA at OSH    Plan:  We reviewed a variety of options with the patient.  Regarding the chronic pain related  to multiple procedures discussed creative approaches: neurolysis, blocks, pain management et    Regarding instability likely related to swelling / fatigue.  Discussed PT / home exercise program and MDP and mobic for swelling.    A nonsteroidal anti-inflammatory drug (NSAID) was prescribed.  We reviewed the risks (including gastric issues, renal issues) and benefits of the medication.  We discussed a scheduled course if no adverse reactions to help with swelling / pain.  We discussed that chronic usage should be checked with primary care physician.

## 2024-10-09 RX ORDER — BISMUTH SUBSALICYLATE 262 MG
1 TABLET,CHEWABLE ORAL DAILY
COMMUNITY

## 2024-10-09 NOTE — PREPROCEDURE INSTRUCTIONS
Reviewed pre-op instructions with patient including local anesthesia instructions-  hospital and check in location, and day of surgery routine.

## 2024-10-16 ENCOUNTER — APPOINTMENT (OUTPATIENT)
Dept: RADIOLOGY | Facility: HOSPITAL | Age: 74
End: 2024-10-16
Payer: MEDICARE

## 2024-10-16 ENCOUNTER — HOSPITAL ENCOUNTER (OUTPATIENT)
Facility: HOSPITAL | Age: 74
Setting detail: OUTPATIENT SURGERY
Discharge: HOME | End: 2024-10-16
Attending: ANESTHESIOLOGY | Admitting: ANESTHESIOLOGY
Payer: MEDICARE

## 2024-10-16 VITALS
HEIGHT: 74 IN | HEART RATE: 69 BPM | WEIGHT: 200.84 LBS | DIASTOLIC BLOOD PRESSURE: 76 MMHG | TEMPERATURE: 96.4 F | OXYGEN SATURATION: 96 % | BODY MASS INDEX: 25.78 KG/M2 | SYSTOLIC BLOOD PRESSURE: 135 MMHG | RESPIRATION RATE: 18 BRPM

## 2024-10-16 PROCEDURE — 76998 US GUIDE INTRAOP: CPT

## 2024-10-16 PROCEDURE — 3600000002 HC OR TIME - INITIAL BASE CHARGE - PROCEDURE LEVEL TWO: Performed by: ANESTHESIOLOGY

## 2024-10-16 PROCEDURE — 3600000007 HC OR TIME - EACH INCREMENTAL 1 MINUTE - PROCEDURE LEVEL TWO: Performed by: ANESTHESIOLOGY

## 2024-10-16 PROCEDURE — 7100000010 HC PHASE TWO TIME - EACH INCREMENTAL 1 MINUTE: Performed by: ANESTHESIOLOGY

## 2024-10-16 PROCEDURE — 2500000004 HC RX 250 GENERAL PHARMACY W/ HCPCS (ALT 636 FOR OP/ED): Performed by: ANESTHESIOLOGY

## 2024-10-16 PROCEDURE — 7100000009 HC PHASE TWO TIME - INITIAL BASE CHARGE: Performed by: ANESTHESIOLOGY

## 2024-10-16 RX ORDER — LIDOCAINE HYDROCHLORIDE 10 MG/ML
INJECTION, SOLUTION EPIDURAL; INFILTRATION; INTRACAUDAL; PERINEURAL AS NEEDED
Status: DISCONTINUED | OUTPATIENT
Start: 2024-10-16 | End: 2024-10-16 | Stop reason: HOSPADM

## 2024-10-16 ASSESSMENT — COLUMBIA-SUICIDE SEVERITY RATING SCALE - C-SSRS
2. HAVE YOU ACTUALLY HAD ANY THOUGHTS OF KILLING YOURSELF?: NO
6. HAVE YOU EVER DONE ANYTHING, STARTED TO DO ANYTHING, OR PREPARED TO DO ANYTHING TO END YOUR LIFE?: NO
1. IN THE PAST MONTH, HAVE YOU WISHED YOU WERE DEAD OR WISHED YOU COULD GO TO SLEEP AND NOT WAKE UP?: NO

## 2024-10-16 ASSESSMENT — PAIN SCALES - GENERAL
PAINLEVEL_OUTOF10: 0 - NO PAIN
PAINLEVEL_OUTOF10: 6

## 2024-10-16 ASSESSMENT — PAIN - FUNCTIONAL ASSESSMENT
PAIN_FUNCTIONAL_ASSESSMENT: 0-10
PAIN_FUNCTIONAL_ASSESSMENT: 0-10

## 2024-10-16 ASSESSMENT — PAIN DESCRIPTION - DESCRIPTORS: DESCRIPTORS: ACHING

## 2024-10-16 NOTE — DISCHARGE INSTRUCTIONS
Nerve Blocks    Why is this procedure done?  Nerve blocks can help to manage pain. By giving you a drug into an exact group of nerves, your doctor may be able to block pain to a specific part of your body. Some nerve blocks are used after surgery, especially if you have had an abdominal surgery. Nerve blocks are used before surgery to lessen the need for opioid drugs during and after surgery.  There are a few kinds of nerve blocks. Some nerve blocks are used to:  Treat pain. These may have a pain drug and a drug to help with swelling.  Find where your pain is coming from. This kind of nerve block will have a pain drug that lasts for only a certain amount of time.  See if another kind of treatment like surgery will help your pain.  Prevent pain during or after a procedure.  Help you avoid surgery.  Give relief of pain during and after surgery.  Lessen the use of opioid drugs needed for pain after surgery.  Block the pain in an area of the body during surgery as anesthesia.  What will the results be?  The nerve block may help to treat or ease your pain. The area may be numb. You may have some pain relief right away. You may be able to use fewer pain medicines after surgery. It also may be easier for you to move around after surgery. Some nerve blocks go away within a few hours. Others give you pain relief for a day or so to a few months or longer. Some nerve blocks can take a few days to work fully.  What happens before the procedure?  Your doctor will ask you about your health history. Talk to the doctor about:  All the drugs you are taking. Be sure to include all prescription, over the counter, and herbal supplements. Tell the doctor if you have any drug allergy. Bring a list of drugs you take with you.  Any bleeding problems. Be sure to tell your doctor if you are taking any drugs that may cause bleeding. Some of these are warfarin, rivaroxaban, apixaban, ticagrelor, clopidogrel, ibuprofen, naproxen, or aspirin.  Certain vitamins and herbs, such as garlic and fish oil, may also add to the risk for bleeding. You may need to stop these drugs as well. Talk to your doctor about them.  What happens during the procedure?  Sometimes, the doctor will give you a special drug to make you sleepy for the nerve block. Other times, you are completely awake. You may also have a nerve block as a part of your surgery.  The doctor will position you in a way to give them easy access to where you will be having the nerve block.  The doctor will clean the area and give you a local numbing drug. The doctor will use a long thin needle to give you the nerve block. Often, the doctor will use a special x-ray, ultrasound, or CT scan to make sure the needle is in the right place. The doctor will inject the drug close to the nerve that is causing your pain. Sometimes they inject the drug in an area that will block pain from a few nerves.  The doctor will take out the needle and place a clean bandage on your skin.  Sometimes, the doctor may leave a catheter in place to deliver medicine over 1 to 2 days. You may have to return to your doctor's office to have the catheter removed.  The procedure takes 15 to 30 minutes.  What happens after the procedure?  If you are not having surgery, you will be able to go home the same day. You may be asked to rest for 15 to 30 minutes. If the doctor gives you a special drug to make you sleepy for the procedure, you will need someone to drive you home.  What care is needed at home?  You will be allowed to shower or take a bath later that same day unless you have a catheter still in place.  You may need other medicines to help with pain as your nerve block wears off.  What follow-up care is needed?  As your body absorbs the drugs, your pain may come back. Talk to your doctor about if you need another nerve block and how often you can have a nerve block.  What problems could happen?  Infection  Bleeding or bruising  Pain  at the injection site  Raised blood sugar  Rash  Itching  Numbness  Nerve injury  Allergic reaction  Puncture or laceration of an organ like the liver, spleen. or bowel  Numbing medicine injected into a blood vessel  Where can I learn more?  American Society of Regional Anesthesia  https://www.mayur.com/patient-information/regional-anesthesia  NHS  https://www.nhs.uk/conditions/epidural/  NHS  https://www.nhs.uk/conditions/local-anaesthesia/  Radiological Society of [place]  http://www.radiologyinfo.org/en/info.cfm?pg=nerveblock  Last Reviewed Date  2020-04-22

## 2024-10-16 NOTE — PROCEDURES
Procedure:s  1- left upper medial genicular nerve block under ultrasound guidance  2- left upper lateral genicular nerve block under ultrasound guidance  3-left lower medial genicular nerve block under ultrasound guidance    Preoperative diagnosis: Severe degenerative joint disease left knee status post total knee replacement x 3 times  Postoperative diagnosis: Same as preop  Anesthesia: Local  History Mr. Wrya is a pleasant 74 years old gentleman.  He has history of left total knee replacement x 3.  The patient had tried conservative treatment for his postop pain and chronic arthropathy left knee but it failed to give him pain relief.  I discussed with the patient risks and benefits of the procedure and the patient agreed to proceed    Procedure description:  The patient was taken to the operating room and stayed on his own bed in supine position.  Timeout/other was obtained.  Basic ASA monitors were applied.  The skin was prepped and draped using DuraPrep.  A linear ultrasound probe was placed on a sterile sleeve and sterile K-Y jelly was used.  The right upper medial genicular neurovascular bundles were visualized.  The skin and subcutaneous tissue of the entry site were infiltrated using 1% lidocaine buffered with bicarb.  Under ultrasound guidance using echo needle the needle reached through the neurovascular bundle of the right upper medial genicular nerve area.  Aspiration was negative for blood.  Injection of a mixture of 0.5% ropivacaine mixed with 3 mg of dexamethasone and an increment of 2 cc at a time of a total dose of 6 cc were performed.  Aspiration was negative and each increment.  The needle was withdrawn intact.  The procedure was repeated on the left upper lateral genicular nerve.  The procedure also was repeated on the left lower medial genicular nerve.    Total number of injections: 3    Blood loss: Minimal to none    Complications: None    The patient tolerated the procedure very well and  stated that he does not have any pain after the injection with knee manipulation.

## 2024-10-16 NOTE — H&P
"History Of Present Illness  Ilan Rosales is a 74 y.o. male presenting with severe degenerative joint disease affecting left knee.  Patient is not a candidate for surgical intervention.     Past Medical History  Past Medical History:   Diagnosis Date    Arthritis     Clotting disorder (Multi)     H/O deep venous thrombosis     Hearing aid worn     HL (hearing loss)     Hyperlipidemia     Hypertension     Joint pain     Polycythemia vera     Wears glasses        Surgical History  Past Surgical History:   Procedure Laterality Date    COLONOSCOPY      JOINT REPLACEMENT      KNEE ARTHROPLASTY Bilateral     TONSILLECTOMY          Social History  He reports that he has never smoked. He has never used smokeless tobacco. He reports that he does not drink alcohol and does not use drugs.    Family History  No family history on file.     Allergies  Bee venom protein (honey bee)    Review of Systems   All other systems reviewed and are negative.       Physical Exam     Last Recorded Vitals  Blood pressure 126/69, pulse 75, temperature 35.7 °C (96.3 °F), temperature source Temporal, resp. rate 16, height 1.88 m (6' 2\"), weight 91.1 kg (200 lb 13.4 oz), SpO2 97%.    Relevant Results             Assessment/Plan   Assessment & Plan      Severe degenerative joint disease left knee for genicular nerve block under ultrasound guidance       I spent 15 minutes in the professional and overall care of this patient.      Yudelka Ann MD    "

## 2025-06-10 ENCOUNTER — APPOINTMENT (OUTPATIENT)
Dept: PRIMARY CARE | Facility: CLINIC | Age: 75
End: 2025-06-10
Payer: MEDICARE

## 2025-06-10 ENCOUNTER — TELEPHONE (OUTPATIENT)
Dept: PRIMARY CARE | Facility: CLINIC | Age: 75
End: 2025-06-10

## 2025-06-10 VITALS
DIASTOLIC BLOOD PRESSURE: 60 MMHG | HEIGHT: 74 IN | SYSTOLIC BLOOD PRESSURE: 110 MMHG | RESPIRATION RATE: 18 BRPM | WEIGHT: 199 LBS | BODY MASS INDEX: 25.54 KG/M2

## 2025-06-10 DIAGNOSIS — M15.9 OSTEOARTHRITIS OF MULTIPLE JOINTS, UNSPECIFIED OSTEOARTHRITIS TYPE: ICD-10-CM

## 2025-06-10 DIAGNOSIS — R13.10 DYSPHAGIA, UNSPECIFIED TYPE: ICD-10-CM

## 2025-06-10 DIAGNOSIS — I10 HTN (HYPERTENSION), BENIGN: ICD-10-CM

## 2025-06-10 DIAGNOSIS — Z00.00 MEDICARE ANNUAL WELLNESS VISIT, SUBSEQUENT: Primary | ICD-10-CM

## 2025-06-10 DIAGNOSIS — E78.00 HYPERCHOLESTEROLEMIA: ICD-10-CM

## 2025-06-10 PROCEDURE — 3078F DIAST BP <80 MM HG: CPT | Performed by: FAMILY MEDICINE

## 2025-06-10 PROCEDURE — 1160F RVW MEDS BY RX/DR IN RCRD: CPT | Performed by: FAMILY MEDICINE

## 2025-06-10 PROCEDURE — 3008F BODY MASS INDEX DOCD: CPT | Performed by: FAMILY MEDICINE

## 2025-06-10 PROCEDURE — G0439 PPPS, SUBSEQ VISIT: HCPCS | Performed by: FAMILY MEDICINE

## 2025-06-10 PROCEDURE — 1123F ACP DISCUSS/DSCN MKR DOCD: CPT | Performed by: FAMILY MEDICINE

## 2025-06-10 PROCEDURE — 1158F ADVNC CARE PLAN TLK DOCD: CPT | Performed by: FAMILY MEDICINE

## 2025-06-10 PROCEDURE — 99214 OFFICE O/P EST MOD 30 MIN: CPT | Performed by: FAMILY MEDICINE

## 2025-06-10 PROCEDURE — 1159F MED LIST DOCD IN RCRD: CPT | Performed by: FAMILY MEDICINE

## 2025-06-10 PROCEDURE — 3074F SYST BP LT 130 MM HG: CPT | Performed by: FAMILY MEDICINE

## 2025-06-10 PROCEDURE — 1170F FXNL STATUS ASSESSED: CPT | Performed by: FAMILY MEDICINE

## 2025-06-10 RX ORDER — PANTOPRAZOLE SODIUM 40 MG/1
40 TABLET, DELAYED RELEASE ORAL DAILY
Qty: 30 TABLET | Refills: 3 | Status: SHIPPED | OUTPATIENT
Start: 2025-06-10 | End: 2025-10-08

## 2025-06-10 ASSESSMENT — PATIENT HEALTH QUESTIONNAIRE - PHQ9
2. FEELING DOWN, DEPRESSED OR HOPELESS: NOT AT ALL
SUM OF ALL RESPONSES TO PHQ9 QUESTIONS 1 AND 2: 0
1. LITTLE INTEREST OR PLEASURE IN DOING THINGS: NOT AT ALL

## 2025-06-10 ASSESSMENT — ACTIVITIES OF DAILY LIVING (ADL)
GROCERY_SHOPPING: INDEPENDENT
BATHING: INDEPENDENT
DOING_HOUSEWORK: INDEPENDENT
DRESSING: INDEPENDENT
MANAGING_FINANCES: INDEPENDENT
TAKING_MEDICATION: INDEPENDENT

## 2025-06-10 NOTE — TELEPHONE ENCOUNTER
PT WAS REFERRED TO [facility] AND AN ENDOSCOPY, WAS ABLE TO GET THE ENDOSCOPY SCHEDULE WITHIN THE WEEK BUT SOONEST OPENING FOR [facility]STR[facility] WAS SEPTEMBER.      PT WANTS TO MAKE SURE THAT YOU THINK IT IS OK TO WAIT THIS LONG FOR THAT APPOINTMENT    PLEASE ADVISE

## 2025-06-10 NOTE — PROGRESS NOTES
"Subjective   Patient ID: Ilan Rosales is a 74 y.o. male who presents for Dysphagia.  HPI Medicare annual wellness visit     Patient presents in office today for trouble swallowing food. Ongoing x 1 month. Admits that he does find himself choking on food most of the time.  Has lost about 20 pounds over the past several months.    [facility]  Gets his labs and physicals done there.    Taking current medications which were reviewed.  Problem list discussed.    Overall doing well.  Staying active.    Has no other new problem /question.     ROS  Constitutional- No activity change.   Eyes- Denies vision changes.  Respiratory- No shortness of breath.  Cardiovascular- No palpitations. No chest pain.  GI- No nausea or vomiting. No diarrhea or constipation. Denies abdominal pain.  Musculoskeletal- Denies joint swelling.  Extremities- No edema.  Neurological- Denies headaches. Denies dizziness.  Skin- No rashes.  Psychiatric/Behavioral- Denies significant anxiety, or depressed mood.     Objective     /60   Resp 18   Ht 1.88 m (6' 2\")   Wt 90.3 kg (199 lb)   BMI 25.55 kg/m²     Allergies[1]    Constitutional-- Well-nourished.  No distress  Head- unremarkable.  Eyes- PERRL.  Conjunctiva normal.  Nose- Normal.  No rhinorrhea noted.  Throat- Oropharynx is clear and moist.  Neck- Supple with no thyromegaly.  No significant cervical adenopathy noted.  Pulmonary/Chest- Breath sounds normal with normal effort.  No wheezing.  Heart- Regular rate and rhythm.  No murmur.  Abdomen- Soft and non-tender.  No masses noted.  Musculoskeletal- Normal ROM.  No significant joint swelling  Extremities- No edema.   Neurological- Alert.  No noted deficits.  Skin- Warm.  No rashes.  Psychiatric/Behavioral- Mood and affect normal.  Behavior normal.     Assessment/Plan   1. Medicare annual wellness visit, subsequent        2. Dysphagia, unspecified type  [place] upper GI w KUB    Referral to Gastroenterology    pantoprazole " (ProtoNix) 40 mg EC tablet      3. Hypercholesterolemia        4. Osteoarthritis of multiple joints, unspecified osteoarthritis type        5. HTN (hypertension), benign               Long talk. Treatment options reviewed.    Medicare wellness questionnaire reviewed in detail. Advanced Directives reviewed today, Importance of having Advance care planing discussed. Patient advised to provide the office with a copy if has not already done so. No problems with activities of daily living. Falls risks reviewed.      Reviewed most recent lab work with patient. Advised patient to remain up to date on routine maintenance and health screening.  Maintain appointments with specialists as scheduled.  Advised patient to remain up to date on immunizations.     Discussed dysphagia and other related symptoms. Complete imaging. Referral placed with specialist.  Start Protonix to be taken daily.  Talked about taking small mouthfuls of food with frequent sips of water.  Avoid hard or dry foods that might be difficult to swallow.    Hypertension controlled.     Osteoarthritis controlled. Educated the patient on osteoarthritis care and management. Educated on muscle strength and exercise.    Discussed importance of natural sources of nutrition.  Advised patient to consume vegetables, salads, fruits, nuts, and proteins such as fish and chicken.      Discussed the importance of routine stretching and exercise.     Continue and take your medications as prescribed.    Health Maintenance issues discussed.    Importance of healthy diet and regular exercise regimen discussed.    We will contact you with any test results ordered. If you do not hear from us, please contact.    Follow-up as instructed or sooner if any problems or symptoms do not resolve as expected.    All medical record entries made by the [facility] were at my direction and personally dictated by me.    I have reviewed the chart and agree that the record accurately reflects my  personal performance of the history, physical exam, discussion, and plan.        Scribe Attestation  By signing my name below, IBlessing Scribe   attest that this documentation has been prepared under the direction and in the presence of Elliot Orellana MD.         [1]   Allergies  Allergen Reactions    Bee Venom Protein (Honey Bee) Anaphylaxis and Shortness of breath     Throat swells

## 2025-06-11 NOTE — TELEPHONE ENCOUNTER
Elliot Orellana MD to [id number] [employer] (Selected Message)        6/10/25  5:09 PM  Please let him know it is good we are getting the upper GI done next week.  We should call to see about getting him into GI sooner, hopefully a week or so after the upper GI.  Please let him know and arrange.  Thanks

## 2025-06-11 NOTE — TELEPHONE ENCOUNTER
Spoke with patient advocate for GI they are going to reach out to confirm but they did have openings tomorrow in [place]

## 2025-06-11 NOTE — TELEPHONE ENCOUNTER
Received a call from Dr. Orellana's office requesting a sooner appointment for the patient; he is currently scheduled with Kaity Rajput in September. Left message for patient to return call to our office to schedule him sooner; number left for patient to use for contact.

## 2025-06-12 ENCOUNTER — HOSPITAL ENCOUNTER (OUTPATIENT)
Dept: RADIOLOGY | Facility: HOSPITAL | Age: 75
Discharge: HOME | End: 2025-06-12
Payer: MEDICARE

## 2025-06-12 DIAGNOSIS — R13.10 DYSPHAGIA, UNSPECIFIED TYPE: ICD-10-CM

## 2025-06-12 PROCEDURE — A9698 NON-RAD CONTRAST MATERIALNOC: HCPCS | Performed by: FAMILY MEDICINE

## 2025-06-12 PROCEDURE — 74240 X-RAY XM UPR GI TRC 1CNTRST: CPT

## 2025-06-12 PROCEDURE — 2500000005 HC RX 250 GENERAL PHARMACY W/O HCPCS: Performed by: FAMILY MEDICINE

## 2025-06-12 PROCEDURE — 2500000001 HC RX 250 WO HCPCS SELF ADMINISTERED DRUGS (ALT 637 FOR MEDICARE OP): Performed by: FAMILY MEDICINE

## 2025-06-12 PROCEDURE — 74246 X-RAY XM UPR GI TRC 2CNTRST: CPT | Performed by: RADIOLOGY

## 2025-06-12 RX ADMIN — ANTACID/ANTIFLATULENT 1 PACKET: 380; 550; 10; 10 GRANULE, EFFERVESCENT ORAL at 14:34

## 2025-06-12 RX ADMIN — BARIUM SULFATE 700 MG: 700 TABLET ORAL at 14:35

## 2025-06-12 RX ADMIN — BARIUM SULFATE 75 ML: 960 POWDER, FOR SUSPENSION ORAL at 14:34

## 2025-06-12 RX ADMIN — BARIUM SULFATE 100 ML: 980 POWDER, FOR SUSPENSION ORAL at 14:34

## 2025-06-16 ENCOUNTER — TELEPHONE (OUTPATIENT)
Dept: PRIMARY CARE | Facility: CLINIC | Age: 75
End: 2025-06-16
Payer: MEDICARE

## 2025-06-16 NOTE — TELEPHONE ENCOUNTER
SPOKE WITH ANGEL IN [facility]/ NUMBER WE CALL FOR URGENT APPOINTMENTS - MESSAGE ALREADY IN THE SYSTEM - ANGEL DID CALL HIM LAST WEEK - LMOM FOR HIM TO RETURN  - CALLED PATIENT - LEFT HIM A MESSAGE LETTING HIM KNOW THAT HE WILL BE GETTING ANOTHER CALL FROM ANGEL IN [facility]

## 2025-06-16 NOTE — TELEPHONE ENCOUNTER
ABILIOI..PATIENTS WIFE CALLING BACK - STATES THAT NO ONE LEFT HER A NUMBER TO RETURN CALL - TRANSFERRED HER DIRECTLY TO ANGEL IN [facility] - LOOKS LIKE HE WAS SCHEDULED FOR 6/17/25 WITH KP MCDANIELS

## 2025-06-16 NOTE — TELEPHONE ENCOUNTER
----- Message from Elliot Orellana sent at 6/16/2025  9:55 AM EDT -----  Spoke with him on the phone.  Test results reviewed.  Distal esophagus stricture noted.  Given his weight loss and issues with swallowing we will immediate GI referral to evaluate this stricture.  Please arrange.  He is aware.  Then let him know when his appointment is.  ----- Message -----  From: Interface, Radiology Results In  Sent: 6/12/2025   3:20 PM EDT  To: Elliot Orellana MD

## 2025-06-17 ENCOUNTER — OFFICE VISIT (OUTPATIENT)
Dept: GASTROENTEROLOGY | Facility: CLINIC | Age: 75
End: 2025-06-17
Payer: MEDICARE

## 2025-06-17 VITALS
SYSTOLIC BLOOD PRESSURE: 103 MMHG | BODY MASS INDEX: 25.67 KG/M2 | HEART RATE: 62 BPM | HEIGHT: 74 IN | OXYGEN SATURATION: 94 % | DIASTOLIC BLOOD PRESSURE: 66 MMHG | WEIGHT: 200 LBS | RESPIRATION RATE: 16 BRPM

## 2025-06-17 DIAGNOSIS — Z86.0100 HISTORY OF COLON POLYPS: ICD-10-CM

## 2025-06-17 DIAGNOSIS — Z79.01 HX OF LONG TERM USE OF BLOOD THINNERS: ICD-10-CM

## 2025-06-17 DIAGNOSIS — R13.10 DYSPHAGIA, UNSPECIFIED TYPE: ICD-10-CM

## 2025-06-17 DIAGNOSIS — Z12.11 ENCOUNTER FOR SCREENING COLONOSCOPY: Primary | ICD-10-CM

## 2025-06-17 PROBLEM — L30.9 ECZEMA: Status: ACTIVE | Noted: 2025-06-17

## 2025-06-17 PROBLEM — L85.3 XEROSIS OF SKIN: Status: ACTIVE | Noted: 2021-11-17

## 2025-06-17 PROBLEM — M79.675 PAIN IN TOES OF BOTH FEET: Status: ACTIVE | Noted: 2021-11-17

## 2025-06-17 PROBLEM — B35.1 ONYCHOMYCOSIS: Status: ACTIVE | Noted: 2021-11-17

## 2025-06-17 PROBLEM — T16.2XXA FOREIGN BODY OF LEFT EAR: Status: ACTIVE | Noted: 2021-05-23

## 2025-06-17 PROBLEM — E66.3 PATIENT OVERWEIGHT: Status: ACTIVE | Noted: 2025-06-17

## 2025-06-17 PROBLEM — E78.5 HYPERLIPIDEMIA: Status: ACTIVE | Noted: 2025-06-17

## 2025-06-17 PROBLEM — M79.674 PAIN IN TOES OF BOTH FEET: Status: ACTIVE | Noted: 2021-11-17

## 2025-06-17 PROCEDURE — G2211 COMPLEX E/M VISIT ADD ON: HCPCS

## 2025-06-17 PROCEDURE — 3074F SYST BP LT 130 MM HG: CPT

## 2025-06-17 PROCEDURE — 1159F MED LIST DOCD IN RCRD: CPT

## 2025-06-17 PROCEDURE — 3078F DIAST BP <80 MM HG: CPT

## 2025-06-17 PROCEDURE — 1036F TOBACCO NON-USER: CPT

## 2025-06-17 PROCEDURE — 99203 OFFICE O/P NEW LOW 30 MIN: CPT

## 2025-06-17 RX ORDER — POLYETHYLENE GLYCOL 3350, SODIUM CHLORIDE, SODIUM BICARBONATE, POTASSIUM CHLORIDE 420; 11.2; 5.72; 1.48 G/4L; G/4L; G/4L; G/4L
4000 POWDER, FOR SOLUTION ORAL ONCE
Qty: 4000 ML | Refills: 0 | Status: SHIPPED | OUTPATIENT
Start: 2025-06-17 | End: 2025-06-17

## 2025-06-17 ASSESSMENT — ENCOUNTER SYMPTOMS
BLOOD IN STOOL: 0
COLOR CHANGE: 0
ABDOMINAL PAIN: 0
CONSTIPATION: 0
DIARRHEA: 0
ARTHRALGIAS: 0
APPETITE CHANGE: 0
ANAL BLEEDING: 0
COUGH: 0
BRUISES/BLEEDS EASILY: 0
CONFUSION: 0
ABDOMINAL DISTENTION: 0
PALPITATIONS: 0
HEMATURIA: 0
SHORTNESS OF BREATH: 0
AGITATION: 0
NAUSEA: 1
TROUBLE SWALLOWING: 1
UNEXPECTED WEIGHT CHANGE: 1
VOICE CHANGE: 0
WEAKNESS: 0
FLANK PAIN: 0
CHILLS: 0
RECTAL PAIN: 0
VOMITING: 1
NERVOUS/ANXIOUS: 0
SEIZURES: 0
JOINT SWELLING: 0
DIZZINESS: 0
FEVER: 0
TREMORS: 0
LIGHT-HEADEDNESS: 0
CHOKING: 1
MYALGIAS: 0

## 2025-06-17 NOTE — H&P (VIEW-ONLY)
Subjective   Dysphagia  Patient ID: Ilan Rosales is a 75 y.o. male who presents for Dysphagia (States that he mostly has a problem with solid food. Had [place] upper GI. Was just started on pantoprazole recently but doesn't notice any difference. This has been an ongoing issue for the year but is getting worse. Has never had EGD.).        History of Present Illness:   Ilan Rosales is a 75 y.o. male with a PMH of HTN, HLD, osteoarthritis, polycythemia vera,  history of DVT (on Eliquis) who presents today for dysphagia. Patient currently taking pantoprazole 40 mg once daily since 6/10/2025.  No prior history of EGD.  Patient had upper GI series 6/10/2025 to assess dysphagia showing segmental narrowing of the distal esophageal segment with transition at the approximate T11 level which persisted throughout the exam suggestive of stricture, mild to moderate dilation of the proximal and mid esophageal segments, tertiary contractions observed in the mild distal esophagus compatible with component of dysmotility with unremarkable appearance of the stomach and duodenum.  Last colonoscopy 12/14/2020 noting diverticulosis in the entire colon otherwise unremarkable with repeated colonoscopy surveillance recommended for 2025.  History of colon polyps on past colonoscopies which I cannot see.    Upon presentation today, patient presents with his wife.  Patient states that he has been experiencing worsening progressing dysphagia for the past 6 months to food, solids and thin liquids like oatmeal.  Drinking water while having these episodes does not help and he feels that he feels a bubbling sensation in his throat during those instances.  Patient has occasional odynophagia associated with the dysphagia to solids.  He has had no appetite change in would like to eat however due to his symptoms he has lost a total of 30 pounds within a year's time.  He denies any globus sensation, however he has occasional nausea and  regurgitation with his symptoms.  He denies NSAID use, smoking, alcohol use and family history of GI related cancers.  He states compliance with pantoprazole 40 mg once daily 30 minutes before meal.  He denies all lower GI symptoms and states his bowel movements are regular, brown and formed without melena, hematochezia, mucus, nocturnal symptoms, abdominal pain and rectal pain.    Upper GI series 6/10/2025-dysphagia  IMPRESSION:  Mild-moderate dilation of the proximal and mid esophageal segments.  Segmental narrowing of the distal esophageal segment with transition  at the approximate T11 level which persisted throughout the exam  suggestive of stricture.      Tertiary contractions observed in the mid-distal esophagus compatible  with a component of dysmotility.      Unremarkable appearance of the stomach and duodenum.    Colonoscopy 12/14/2020-Dr. Loza-screening for colorectal malignant neoplasm  Impression:    - Diverticulosis in the entire examined colon.                         - The examination was otherwise normal.                         - No specimens collected.  Recommendation:        - Discharge patient to home.                         - Resume previous diet.                         - Continue present medications.                         - Repeat colonoscopy in 5 years for surveillance.      Review of Systems  ROS Negative unless otherwise stated above.    Past Medical/Surgical History  Medical History[1]   Surgical History[2]     Social History   reports that he has never smoked. He has never used smokeless tobacco. He reports that he does not drink alcohol and does not use drugs.     Family History  family history is not on file.     Allergies  RX Allergies[3]    Medications  Current Outpatient Medications   Medication Instructions    apixaban (Eliquis) 5 mg tablet 2 times daily    atorvastatin (LIPITOR) 20 mg, Daily RT    hydroxyurea (Hydrea) 500 mg capsule 1 capsule, 2 times daily    lisinopril 20  mg, Daily    meloxicam (MOBIC) 15 mg, oral, Daily with breakfast    methylPREDNISolone (Medrol Dospak) 4 mg tablets Use as directed by package instructions    multivitamin tablet 1 tablet, Daily    pantoprazole (PROTONIX) 40 mg, oral, Daily    vitamins A,C,E-zinc-copper (PreserVision AREDS) 4,296 mcg-226 mg-90 mg capsule 1 capsule, 2 times daily          Review of Systems   Constitutional:  Positive for unexpected weight change. Negative for appetite change, chills and fever.   HENT:  Positive for trouble swallowing. Negative for mouth sores, nosebleeds and voice change.         Odynophagia   Respiratory:  Positive for choking. Negative for cough and shortness of breath.    Cardiovascular:  Negative for chest pain, palpitations and leg swelling.   Gastrointestinal:  Positive for nausea and vomiting. Negative for abdominal distention, abdominal pain, anal bleeding, blood in stool, constipation, diarrhea and rectal pain.   Genitourinary:  Negative for flank pain and hematuria.   Musculoskeletal:  Negative for arthralgias, joint swelling and myalgias.   Skin:  Negative for color change and rash.   Allergic/Immunologic: Negative for environmental allergies, food allergies and immunocompromised state.   Neurological:  Negative for dizziness, tremors, seizures, syncope, weakness and light-headedness.   Hematological:  Does not bruise/bleed easily.   Psychiatric/Behavioral:  Negative for agitation and confusion. The patient is not nervous/anxious.        Objective   Physical Exam  Vitals reviewed.   Constitutional:       Appearance: Normal appearance. He is normal weight.   HENT:      Head: Normocephalic and atraumatic.      Nose: Nose normal.      Mouth/Throat:      Mouth: Mucous membranes are moist.   Eyes:      Pupils: Pupils are equal, round, and reactive to light.   Cardiovascular:      Rate and Rhythm: Normal rate.   Pulmonary:      Effort: Pulmonary effort is normal.      Breath sounds: Normal breath sounds.  "  Abdominal:      General: Bowel sounds are normal.      Palpations: Abdomen is soft.   Musculoskeletal:         General: Normal range of motion.      Cervical back: Normal range of motion.   Skin:     General: Skin is warm and dry.      Capillary Refill: Capillary refill takes less than 2 seconds.   Neurological:      Mental Status: He is alert and oriented to person, place, and time.       /66   Pulse 62   Resp 16   Ht 1.88 m (6' 2\")   Wt 90.7 kg (200 lb)   SpO2 94%   BMI 25.68 kg/m²      No results found for: \"WBC\", \"HGB\", \"HCT\", \"MCV\", \"PLT\"        No lab exists for component: \"LABALBU\"    No results found for: \"AFP\"  No results found for: \"TSH\"      Assessment/Plan   To further assess findings of upper GI series pertaining to dysmotility will order esophageal manometry with impedance to further assess.  To further assess stricture will order EGD.  Risk of procedures discussed with patient and his wife to their full understanding.  Patient is also due for surveillance colonoscopy.  Will need cardiac clearance as he is on Plavix.  [facility].  Will do this in the hospital setting, [facility] with Dr. Chua.  Patient will continue pantoprazole 40 mg daily 30 minutes before meal and adhere to antireflux lifestyle.  He will follow-up with me post scopes or sooner if problems arise.  Diagnoses and all orders for this visit:  Encounter for screening colonoscopy  -     Colonoscopy Screening; Average Risk Patient; Future  -     Comprehensive metabolic panel; Future  -     CBC; Future  Dysphagia, unspecified type  -     Referral to Gastroenterology  -     Esophagogastroduodenoscopy (EGD); Future  -     Esophageal Manometry W/pH Impedance; Future  History of colon polyps  -     Colonoscopy Screening; Average Risk Patient; Future  Hx of long term use of blood thinners  -     CBC; Future      Natalie ALVARADO-CNP              [1]   Past Medical History:  Diagnosis Date "    Arthritis     Clotting disorder (Multi)     H/O deep venous thrombosis     Hearing aid worn     HL (hearing loss)     Hyperlipidemia     Hypertension     Joint pain     Polycythemia vera     Wears glasses    [2]   Past Surgical History:  Procedure Laterality Date    COLONOSCOPY      JOINT REPLACEMENT      KNEE ARTHROPLASTY Bilateral     TONSILLECTOMY     [3]   Allergies  Allergen Reactions    Bee Venom Protein (Honey Bee) Anaphylaxis and Shortness of breath     Throat swells

## 2025-06-17 NOTE — PROGRESS NOTES
Subjective   Dysphagia  Patient ID: Ilan Rosales is a 75 y.o. male who presents for Dysphagia (States that he mostly has a problem with solid food. Had [place] upper GI. Was just started on pantoprazole recently but doesn't notice any difference. This has been an ongoing issue for the year but is getting worse. Has never had EGD.).        History of Present Illness:   Ilan Rosales is a 75 y.o. male with a PMH of HTN, HLD, osteoarthritis, polycythemia vera,  history of DVT (on Eliquis) who presents today for dysphagia. Patient currently taking pantoprazole 40 mg once daily since 6/10/2025.  No prior history of EGD.  Patient had upper GI series 6/10/2025 to assess dysphagia showing segmental narrowing of the distal esophageal segment with transition at the approximate T11 level which persisted throughout the exam suggestive of stricture, mild to moderate dilation of the proximal and mid esophageal segments, tertiary contractions observed in the mild distal esophagus compatible with component of dysmotility with unremarkable appearance of the stomach and duodenum.  Last colonoscopy 12/14/2020 noting diverticulosis in the entire colon otherwise unremarkable with repeated colonoscopy surveillance recommended for 2025.  History of colon polyps on past colonoscopies which I cannot see.    Upon presentation today, patient presents with his wife.  Patient states that he has been experiencing worsening progressing dysphagia for the past 6 months to food, solids and thin liquids like oatmeal.  Drinking water while having these episodes does not help and he feels that he feels a bubbling sensation in his throat during those instances.  Patient has occasional odynophagia associated with the dysphagia to solids.  He has had no appetite change in would like to eat however due to his symptoms he has lost a total of 30 pounds within a year's time.  He denies any globus sensation, however he has occasional nausea and  regurgitation with his symptoms.  He denies NSAID use, smoking, alcohol use and family history of GI related cancers.  He states compliance with pantoprazole 40 mg once daily 30 minutes before meal.  He denies all lower GI symptoms and states his bowel movements are regular, brown and formed without melena, hematochezia, mucus, nocturnal symptoms, abdominal pain and rectal pain.    Upper GI series 6/10/2025-dysphagia  IMPRESSION:  Mild-moderate dilation of the proximal and mid esophageal segments.  Segmental narrowing of the distal esophageal segment with transition  at the approximate T11 level which persisted throughout the exam  suggestive of stricture.      Tertiary contractions observed in the mid-distal esophagus compatible  with a component of dysmotility.      Unremarkable appearance of the stomach and duodenum.    Colonoscopy 12/14/2020-Dr. Loza-screening for colorectal malignant neoplasm  Impression:    - Diverticulosis in the entire examined colon.                         - The examination was otherwise normal.                         - No specimens collected.  Recommendation:        - Discharge patient to home.                         - Resume previous diet.                         - Continue present medications.                         - Repeat colonoscopy in 5 years for surveillance.      Review of Systems  ROS Negative unless otherwise stated above.    Past Medical/Surgical History  Medical History[1]   Surgical History[2]     Social History   reports that he has never smoked. He has never used smokeless tobacco. He reports that he does not drink alcohol and does not use drugs.     Family History  family history is not on file.     Allergies  RX Allergies[3]    Medications  Current Outpatient Medications   Medication Instructions    apixaban (Eliquis) 5 mg tablet 2 times daily    atorvastatin (LIPITOR) 20 mg, Daily RT    hydroxyurea (Hydrea) 500 mg capsule 1 capsule, 2 times daily    lisinopril 20  mg, Daily    meloxicam (MOBIC) 15 mg, oral, Daily with breakfast    methylPREDNISolone (Medrol Dospak) 4 mg tablets Use as directed by package instructions    multivitamin tablet 1 tablet, Daily    pantoprazole (PROTONIX) 40 mg, oral, Daily    vitamins A,C,E-zinc-copper (PreserVision AREDS) 4,296 mcg-226 mg-90 mg capsule 1 capsule, 2 times daily          Review of Systems   Constitutional:  Positive for unexpected weight change. Negative for appetite change, chills and fever.   HENT:  Positive for trouble swallowing. Negative for mouth sores, nosebleeds and voice change.         Odynophagia   Respiratory:  Positive for choking. Negative for cough and shortness of breath.    Cardiovascular:  Negative for chest pain, palpitations and leg swelling.   Gastrointestinal:  Positive for nausea and vomiting. Negative for abdominal distention, abdominal pain, anal bleeding, blood in stool, constipation, diarrhea and rectal pain.   Genitourinary:  Negative for flank pain and hematuria.   Musculoskeletal:  Negative for arthralgias, joint swelling and myalgias.   Skin:  Negative for color change and rash.   Allergic/Immunologic: Negative for environmental allergies, food allergies and immunocompromised state.   Neurological:  Negative for dizziness, tremors, seizures, syncope, weakness and light-headedness.   Hematological:  Does not bruise/bleed easily.   Psychiatric/Behavioral:  Negative for agitation and confusion. The patient is not nervous/anxious.        Objective   Physical Exam  Vitals reviewed.   Constitutional:       Appearance: Normal appearance. He is normal weight.   HENT:      Head: Normocephalic and atraumatic.      Nose: Nose normal.      Mouth/Throat:      Mouth: Mucous membranes are moist.   Eyes:      Pupils: Pupils are equal, round, and reactive to light.   Cardiovascular:      Rate and Rhythm: Normal rate.   Pulmonary:      Effort: Pulmonary effort is normal.      Breath sounds: Normal breath sounds.  "  Abdominal:      General: Bowel sounds are normal.      Palpations: Abdomen is soft.   Musculoskeletal:         General: Normal range of motion.      Cervical back: Normal range of motion.   Skin:     General: Skin is warm and dry.      Capillary Refill: Capillary refill takes less than 2 seconds.   Neurological:      Mental Status: He is alert and oriented to person, place, and time.       /66   Pulse 62   Resp 16   Ht 1.88 m (6' 2\")   Wt 90.7 kg (200 lb)   SpO2 94%   BMI 25.68 kg/m²      No results found for: \"WBC\", \"HGB\", \"HCT\", \"MCV\", \"PLT\"        No lab exists for component: \"LABALBU\"    No results found for: \"AFP\"  No results found for: \"TSH\"      Assessment/Plan   To further assess findings of upper GI series pertaining to dysmotility will order esophageal manometry with impedance to further assess.  To further assess stricture will order EGD.  Risk of procedures discussed with patient and his wife to their full understanding.  Patient is also due for surveillance colonoscopy.  Will need cardiac clearance as he is on Plavix.  [facility].  Will do this in the hospital setting, [facility] with Dr. Chua.  Patient will continue pantoprazole 40 mg daily 30 minutes before meal and adhere to antireflux lifestyle.  He will follow-up with me post scopes or sooner if problems arise.  Diagnoses and all orders for this visit:  Encounter for screening colonoscopy  -     Colonoscopy Screening; Average Risk Patient; Future  -     Comprehensive metabolic panel; Future  -     CBC; Future  Dysphagia, unspecified type  -     Referral to Gastroenterology  -     Esophagogastroduodenoscopy (EGD); Future  -     Esophageal Manometry W/pH Impedance; Future  History of colon polyps  -     Colonoscopy Screening; Average Risk Patient; Future  Hx of long term use of blood thinners  -     CBC; Future      Natalie ALVARADO-CNP              [1]   Past Medical History:  Diagnosis Date "    Arthritis     Clotting disorder (Multi)     H/O deep venous thrombosis     Hearing aid worn     HL (hearing loss)     Hyperlipidemia     Hypertension     Joint pain     Polycythemia vera     Wears glasses    [2]   Past Surgical History:  Procedure Laterality Date    COLONOSCOPY      JOINT REPLACEMENT      KNEE ARTHROPLASTY Bilateral     TONSILLECTOMY     [3]   Allergies  Allergen Reactions    Bee Venom Protein (Honey Bee) Anaphylaxis and Shortness of breath     Throat swells

## 2025-06-18 LAB
ALBUMIN SERPL-MCNC: 4.2 G/DL (ref 3.6–5.1)
ALP SERPL-CCNC: 52 U/L (ref 35–144)
ALT SERPL-CCNC: 14 U/L (ref 9–46)
ANION GAP SERPL CALCULATED.4IONS-SCNC: 9 MMOL/L (CALC) (ref 7–17)
AST SERPL-CCNC: 22 U/L (ref 10–35)
BILIRUB SERPL-MCNC: 0.8 MG/DL (ref 0.2–1.2)
BUN SERPL-MCNC: 20 MG/DL (ref 7–25)
CALCIUM SERPL-MCNC: 9.2 MG/DL (ref 8.6–10.3)
CHLORIDE SERPL-SCNC: 101 MMOL/L (ref 98–110)
CO2 SERPL-SCNC: 29 MMOL/L (ref 20–32)
CREAT SERPL-MCNC: 1.39 MG/DL (ref 0.7–1.28)
EGFRCR SERPLBLD CKD-EPI 2021: 53 ML/MIN/1.73M2
ERYTHROCYTE [DISTWIDTH] IN BLOOD BY AUTOMATED COUNT: 14.2 % (ref 11–15)
GLUCOSE SERPL-MCNC: 93 MG/DL (ref 65–99)
HCT VFR BLD AUTO: 36.3 % (ref 38.5–50)
HGB BLD-MCNC: 12.8 G/DL (ref 13.2–17.1)
MCH RBC QN AUTO: 43.2 PG (ref 27–33)
MCHC RBC AUTO-ENTMCNC: 35.3 G/DL (ref 32–36)
MCV RBC AUTO: 122.6 FL (ref 80–100)
PLATELET # BLD AUTO: 289 THOUSAND/UL (ref 140–400)
PMV BLD REES-ECKER: 9.8 FL (ref 7.5–12.5)
POTASSIUM SERPL-SCNC: 4.1 MMOL/L (ref 3.5–5.3)
PROT SERPL-MCNC: 6.4 G/DL (ref 6.1–8.1)
RBC # BLD AUTO: 2.96 MILLION/UL (ref 4.2–5.8)
SODIUM SERPL-SCNC: 139 MMOL/L (ref 135–146)
WBC # BLD AUTO: 5.1 THOUSAND/UL (ref 3.8–10.8)

## 2025-06-23 ENCOUNTER — TELEPHONE (OUTPATIENT)
Dept: PRIMARY CARE | Facility: CLINIC | Age: 75
End: 2025-06-23
Payer: MEDICARE

## 2025-06-23 DIAGNOSIS — I10 HYPERTENSION: ICD-10-CM

## 2025-06-23 NOTE — TELEPHONE ENCOUNTER
Patient's wife on HIPAA is calling he seen a gastro doctor and they want him to see a cardiologist before any tests. They were wondering if you think he needs to see a cardiologist and if so which one you recommend. They are aware you are out of the office until Wednesday but still wanted to leave a message.

## 2025-06-24 NOTE — TELEPHONE ENCOUNTER
LMOM TO RETURN CALL ON HOME NUMBER AND CELL NUMBER    REFERRAL STARTED - NEED TO FIND OUT WHY THEY NEEDED HIM TO BE SEEN (DX).  ALSO NEED TO KNOW HIS AVAILABILITY TO SCHEDULE APPT.

## 2025-06-24 NOTE — TELEPHONE ENCOUNTER
Elliot Orellana MD to [id number]  Lori Roberts (Selected Message)        6/23/25  9:01 PM  Please let them know I recommend Dr. Benítez for any of the [facility] heart physicians.  Obviously for preop they are usually good about getting patients seen.  It would even be okay if he sees the nurse practitioner to get things started.  Please let them know and arrange as soon as possible.  Thanks

## 2025-06-30 ENCOUNTER — CLINICAL SUPPORT (OUTPATIENT)
Dept: PREADMISSION TESTING | Facility: HOSPITAL | Age: 75
End: 2025-06-30
Payer: MEDICARE

## 2025-06-30 VITALS — WEIGHT: 184 LBS | BODY MASS INDEX: 23.62 KG/M2

## 2025-06-30 NOTE — PREPROCEDURE INSTRUCTIONS

## 2025-07-10 ENCOUNTER — TELEPHONE (OUTPATIENT)
Dept: CARDIOLOGY | Facility: CLINIC | Age: 75
End: 2025-07-10
Payer: MEDICARE

## 2025-07-10 NOTE — TELEPHONE ENCOUNTER
Rec'd fax from  [facility], Dr. Iván Chua. Pt scheduled for 7/16/25 for EGD and Colonoscopy and requesting cardiac clearance.     Pt is scheduled to see Dr. Lowery 7/11/25 and to be addressed at that time.

## 2025-07-11 ENCOUNTER — APPOINTMENT (OUTPATIENT)
Dept: CARDIOLOGY | Facility: CLINIC | Age: 75
End: 2025-07-11
Payer: MEDICARE

## 2025-07-11 VITALS
HEIGHT: 71 IN | BODY MASS INDEX: 26.96 KG/M2 | DIASTOLIC BLOOD PRESSURE: 58 MMHG | SYSTOLIC BLOOD PRESSURE: 98 MMHG | WEIGHT: 192.6 LBS | HEART RATE: 66 BPM

## 2025-07-11 DIAGNOSIS — I10 HYPERTENSION: ICD-10-CM

## 2025-07-11 DIAGNOSIS — Z76.89 ENCOUNTER TO ESTABLISH CARE WITH NEW PROVIDER: ICD-10-CM

## 2025-07-11 DIAGNOSIS — Z01.818 PRE-OPERATIVE CLEARANCE: ICD-10-CM

## 2025-07-11 PROCEDURE — 93000 ELECTROCARDIOGRAM COMPLETE: CPT

## 2025-07-11 PROCEDURE — 3074F SYST BP LT 130 MM HG: CPT

## 2025-07-11 PROCEDURE — 1036F TOBACCO NON-USER: CPT

## 2025-07-11 PROCEDURE — 99204 OFFICE O/P NEW MOD 45 MIN: CPT

## 2025-07-11 PROCEDURE — 3078F DIAST BP <80 MM HG: CPT

## 2025-07-11 PROCEDURE — 1159F MED LIST DOCD IN RCRD: CPT

## 2025-07-11 NOTE — PROGRESS NOTES
Referred by Elliot Sandoval MD  Chief complaint:   Chief Complaint   Patient presents with    Pre-op Clearance     POC to have EGD with Dr. THA Chua on 7/16/2025        History of Present Illness  Ilan Rosales is a 75 y.o. year old male patient with history of hypertension, hyperlipidemia, s/p total knee replacement.    Patient presents today for cardiac clearance for upcoming EGD and Colonoscopy scheduled for 7/16/25.       Social History[1]    Outpatient Medications:  Current Outpatient Medications   Medication Instructions    apixaban (Eliquis) 5 mg tablet 2 times daily    atorvastatin (LIPITOR) 20 mg, Daily RT    hydroxyurea (Hydrea) 500 mg capsule 1 capsule, 2 times daily    lisinopril 20 mg, Daily    meloxicam (MOBIC) 15 mg, oral, Daily with breakfast    multivitamin tablet 1 tablet, Daily    pantoprazole (PROTONIX) 40 mg, oral, Daily    vitamins A,C,E-zinc-copper (PreserVision AREDS) 4,296 mcg-226 mg-90 mg capsule 1 capsule, 2 times daily         Vitals:  Vitals:    07/11/25 1405   BP: 98/58   Pulse: 66       Physical Exam:  General: NAD, well-appearing  HEENT: moist mucous membranes, no jaundice  Neck: No JVD, no carotid bruit  Lungs: CTA diana, no wheezing or rales  Cardiac: RRR, no murmurs  Abdomen: soft, non-tender, non-distended  Extremities: 2+ radial pulses,   Skin: warm, dry, no wound  Neurologic: AAOx3,  no focal deficits        Assessment/Plan     The patient presents today for cardiac clearance prior to undergoing an esophagogastroduodenoscopy (EGD) and colonoscopy. He has no significant cardiovascular history. The referral was prompted solely by the chronic use of Eliquis, which the patient has already discontinued on his own seven days ago.  The request is related to the need to hold the medication prior to the procedure. Given that the patient is no longer taking the medication and is clinically stable, I see no contraindication for him to proceed with both the EGD and colonoscopy.  He was instructed to resume the medication 48 to 72 hours after the procedures, as appropriate.    Chung Lowery MD, Ph,D, [facility]  Interventional Cardiologist - [facility]  Director of Structural and Valvular Heart Intervention - [facility]     IBrittney LPN am scribing for, and in the presence of Dr. Beverley MD.    I, Dr. Beverley MD, personally performed the services described in the documentation as scribed by Brittney Voss LPN  in my presence, and confirm it is both accurate and complete.      **Disclaimer: This note was dictated by speech recognition, and every effort has been made to prevent any error in transcription, however minor errors may be present**           [1]   Social History  Tobacco Use    Smoking status: Never    Smokeless tobacco: Never   Vaping Use    Vaping status: Never Used   Substance Use Topics    Alcohol use: Never    Drug use: Never

## 2025-07-16 ENCOUNTER — ANESTHESIA (OUTPATIENT)
Dept: GASTROENTEROLOGY | Facility: HOSPITAL | Age: 75
End: 2025-07-16
Payer: MEDICARE

## 2025-07-16 ENCOUNTER — HOSPITAL ENCOUNTER (OUTPATIENT)
Dept: GASTROENTEROLOGY | Facility: HOSPITAL | Age: 75
Discharge: HOME | End: 2025-07-16
Payer: MEDICARE

## 2025-07-16 ENCOUNTER — ANESTHESIA EVENT (OUTPATIENT)
Dept: GASTROENTEROLOGY | Facility: HOSPITAL | Age: 75
End: 2025-07-16
Payer: MEDICARE

## 2025-07-16 VITALS
SYSTOLIC BLOOD PRESSURE: 121 MMHG | DIASTOLIC BLOOD PRESSURE: 71 MMHG | WEIGHT: 187.39 LBS | RESPIRATION RATE: 16 BRPM | HEIGHT: 71 IN | TEMPERATURE: 97 F | HEART RATE: 61 BPM | BODY MASS INDEX: 26.23 KG/M2 | OXYGEN SATURATION: 99 %

## 2025-07-16 DIAGNOSIS — Z86.0100 HISTORY OF COLON POLYPS: ICD-10-CM

## 2025-07-16 DIAGNOSIS — R13.10 DYSPHAGIA, UNSPECIFIED TYPE: ICD-10-CM

## 2025-07-16 DIAGNOSIS — Z12.11 ENCOUNTER FOR SCREENING COLONOSCOPY: ICD-10-CM

## 2025-07-16 PROCEDURE — 7100000009 HC PHASE TWO TIME - INITIAL BASE CHARGE

## 2025-07-16 PROCEDURE — 43239 EGD BIOPSY SINGLE/MULTIPLE: CPT | Performed by: INTERNAL MEDICINE

## 2025-07-16 PROCEDURE — 3700000001 HC GENERAL ANESTHESIA TIME - INITIAL BASE CHARGE

## 2025-07-16 PROCEDURE — 3700000002 HC GENERAL ANESTHESIA TIME - EACH INCREMENTAL 1 MINUTE

## 2025-07-16 PROCEDURE — G0121 COLON CA SCRN NOT HI RSK IND: HCPCS | Performed by: INTERNAL MEDICINE

## 2025-07-16 PROCEDURE — 7100000010 HC PHASE TWO TIME - EACH INCREMENTAL 1 MINUTE

## 2025-07-16 PROCEDURE — 2500000004 HC RX 250 GENERAL PHARMACY W/ HCPCS (ALT 636 FOR OP/ED): Performed by: NURSE ANESTHETIST, CERTIFIED REGISTERED

## 2025-07-16 PROCEDURE — 45378 DIAGNOSTIC COLONOSCOPY: CPT | Performed by: INTERNAL MEDICINE

## 2025-07-16 RX ORDER — SODIUM CHLORIDE, SODIUM LACTATE, POTASSIUM CHLORIDE, CALCIUM CHLORIDE 600; 310; 30; 20 MG/100ML; MG/100ML; MG/100ML; MG/100ML
INJECTION, SOLUTION INTRAVENOUS CONTINUOUS PRN
Status: DISCONTINUED | OUTPATIENT
Start: 2025-07-16 | End: 2025-07-16

## 2025-07-16 RX ORDER — PROPOFOL 10 MG/ML
INJECTION, EMULSION INTRAVENOUS AS NEEDED
Status: DISCONTINUED | OUTPATIENT
Start: 2025-07-16 | End: 2025-07-16

## 2025-07-16 RX ADMIN — PROPOFOL 50 MG: 10 INJECTION, EMULSION INTRAVENOUS at 12:50

## 2025-07-16 RX ADMIN — PROPOFOL 50 MG: 10 INJECTION, EMULSION INTRAVENOUS at 12:56

## 2025-07-16 RX ADMIN — SODIUM CHLORIDE, POTASSIUM CHLORIDE, SODIUM LACTATE AND CALCIUM CHLORIDE: 600; 310; 30; 20 INJECTION, SOLUTION INTRAVENOUS at 12:28

## 2025-07-16 RX ADMIN — PROPOFOL 100 MG: 10 INJECTION, EMULSION INTRAVENOUS at 13:09

## 2025-07-16 RX ADMIN — PROPOFOL 50 MG: 10 INJECTION, EMULSION INTRAVENOUS at 13:01

## 2025-07-16 RX ADMIN — PROPOFOL 50 MG: 10 INJECTION, EMULSION INTRAVENOUS at 13:05

## 2025-07-16 RX ADMIN — PROPOFOL 50 MG: 10 INJECTION, EMULSION INTRAVENOUS at 12:43

## 2025-07-16 RX ADMIN — PROPOFOL 50 MG: 10 INJECTION, EMULSION INTRAVENOUS at 12:41

## 2025-07-16 RX ADMIN — PROPOFOL 200 MG: 10 INJECTION, EMULSION INTRAVENOUS at 12:33

## 2025-07-16 SDOH — HEALTH STABILITY: MENTAL HEALTH: CURRENT SMOKER: 0

## 2025-07-16 ASSESSMENT — PAIN SCALES - GENERAL
PAINLEVEL_OUTOF10: 0 - NO PAIN
PAIN_LEVEL: 0
PAINLEVEL_OUTOF10: 0 - NO PAIN
PAINLEVEL_OUTOF10: 0 - NO PAIN

## 2025-07-16 ASSESSMENT — COLUMBIA-SUICIDE SEVERITY RATING SCALE - C-SSRS
6. HAVE YOU EVER DONE ANYTHING, STARTED TO DO ANYTHING, OR PREPARED TO DO ANYTHING TO END YOUR LIFE?: NO
1. IN THE PAST MONTH, HAVE YOU WISHED YOU WERE DEAD OR WISHED YOU COULD GO TO SLEEP AND NOT WAKE UP?: NO
2. HAVE YOU ACTUALLY HAD ANY THOUGHTS OF KILLING YOURSELF?: NO

## 2025-07-16 ASSESSMENT — PAIN - FUNCTIONAL ASSESSMENT
PAIN_FUNCTIONAL_ASSESSMENT: 0-10

## 2025-07-16 NOTE — ANESTHESIA POSTPROCEDURE EVALUATION
Patient: Ilan Rosales    Procedure Summary       Date: 07/16/25 Room / Location: [facility]    Anesthesia Start: 1228 Anesthesia Stop:     Procedures:       EGD      COLONOSCOPY Diagnosis:       Dysphagia, unspecified type      Encounter for screening colonoscopy      History of colon polyps    Scheduled Providers: Iván Chua MD; Bety Villalba RN; Jewel Hicks; Yudelka Ann MD Responsible Provider: Yudelka Ann MD    Anesthesia Type: MAC ASA Status: 2            Anesthesia Type: MAC    Vitals Value Taken Time   /55 07/16/25 13:13   Temp 36.5 07/16/25 13:13   Pulse 62 07/16/25 13:13   Resp 18 07/16/25 13:13   SpO2 100 07/16/25 13:13       Anesthesia Post Evaluation    Patient location during evaluation: bedside  Patient participation: complete - patient participated  Level of consciousness: awake and alert  Pain score: 0  Pain management: adequate  Airway patency: patent  Cardiovascular status: acceptable and stable  Respiratory status: acceptable and room air  Hydration status: acceptable  Postoperative Nausea and Vomiting: none        No notable events documented.

## 2025-07-16 NOTE — DISCHARGE INSTRUCTIONS
Physician phone number provided to patient.     Moderate Sedation in Adults Discharge Instructions    About this topic  Moderate sedation is also known as conscious sedation. It changes your state of being awake or consciousness. With this sedation, you may feel slight pain or pressure during a procedure. The drugs help you to relax and may even allow you to sleep. It will be easy to wake you and you may talk and answer questions while under sedation. Most likely, you will not remember what happens while under this sedation.  What care is needed at home?  Ask your doctor what you need to do when you go home. Make sure you understand everything the doctor says. This way you will know what you need to do.  You will not be allowed to drive right away after the procedure. Ask a family member or a friend to drive you home.  Do not operate heavy or dangerous machinery for at least 24 hours.  Do not make major decisions or sign important papers for at least 24 hours. You may not be thinking clearly.  Avoid beer, wine, or mixed drinks (alcohol) for at least 24 hours.  You are at a higher risk of falling for at least 24 hours after moderate sedation.  Take extra care when you get up.  Do not change positions quickly.  Do not rush when you need to go to the bathroom or to answer the phone.  Ask for help if you feel unsteady when you try to walk.  Wear shoes with non-slip soles and low heels.  What follow-up care is needed?  Your doctor may ask you to make visits to the office to check on your progress. Be sure to keep these visits. Your doctor may also refer you to other doctors or tell you that you need more tests or care.  What drugs may be needed?  The doctor may order drugs to:  Help with pain  Treat an upset stomach or throwing up  Will physical activity be limited?  Rest for the day of the procedure. Avoid strenuous activities like heavy lifting and hard exercise. Talk to your doctor about whether you need to limit  lifting or exercise after your procedure.  What changes to diet are needed?  Start with a light diet when you are fully awake. This includes things that are easy to swallow like soups, pudding, jello, toast, and eggs. Slowly progress to your normal diet.  What problems could happen?  Low blood pressure  Breathing problems  Upset stomach or throwing up  Dizziness  When do I need to call the doctor?  Feel dizzy, weak, or tired  Faint  Very bad headache  Upset stomach or throwing up  To follow up for more tests or care  Teach Back: Helping You Understand  The Teach Back Method helps you understand the information we are giving you. After you talk with the staff, tell them in your own words what you learned. This helps to make sure the staff has described each thing clearly. It also helps to explain things that may have been confusing. Before going home, make sure you can do these:  I can tell you about my procedure.  I can tell you if I need more tests or care.  I can tell you what is good for me to eat and drink the next day.  I can tell you what I would do if I feel dizzy, weak, or tired.  Last Reviewed Date  2020-03-02    High Fiber Diet    About this topic  Dietary fiber helps many illnesses. It can help you if you cannot have a bowel movement or if you have loose stools. Fiber can also lower your risk of diabetes and heart disease. Fiber can help with weight loss by helping you feel flaherty after meals.  You can find fiber in fruits, vegetables, nuts and seeds, whole grains, and legumes. The fiber is the part of the plant food that your body cannot break down and absorb. It passes through your stomach, small bowel, colon, and out your body.  There are two kinds of fiber: Insoluble and soluble fiber. Insoluble fiber helps you pass foods through your digestive system. Insoluble fiber can help you with hard stools. Soluble fiber draws water in and turns it into a gel-like form making digestion slow down. Both are  important.    What will the results be?  A high fiber diet can help you with bowel problems like stools that are too hard or too loose. It can also help prevent hemorrhoids and other colon problems. A high fiber diet can also help control your weight and lower blood sugar and cholesterol levels.  What changes to diet are needed?  The amount of fiber you need is based on your age, gender, and health.  Try to get 20 to 35 grams of fiber in your diet each day. Most people in the [place] only eat 15 grams of fiber daily.  Drink at least 8 cups (1920 mL) of fluid each day.  When is this diet used?  Your doctor may talk with you about this diet if you have belly problems.  Who should use this diet?  Older children, young people, and adults can have this diet.  Who should not use this diet?  Some people should not use this diet. Check with your doctor if you have:  Diverticulitis  Active Crohn's disease  Ulcerative colitis  Bowel inflammation  Certain types of GI surgery  Talk to your child's doctor before starting your child on a high fiber diet.  What foods are good to eat?  To get the most from fiber in your diet, eat a wide variety of high fiber foods. Some examples are:  Vegetables like:  Spinach  Peas  Artichoke  Sweet potatoes with skin  Broccoli  Fruits like:  Raspberries  Blueberries  Blackberries  Apples with skin  Dried fruits  Grains like:  Oat bran  Barley  Whole wheat products  Wheat bran  Dried beans and nuts like:  Sunflower seeds  Almonds  Black beans  Chickpeas  What problems could happen?  Sudden increase of fiber intake can lead to gas, pain, fullness in your belly, and loose stools. Increase fiber gradually while drinking plenty of fluids.  Do not eat too much fiber. Your body will not take in vitamins and minerals as well if you eat too much fiber.  When do I need to call the doctor?  Health problem is not better or you are feeling worse.  Helpful tips  Start slow as you add more fiber to your diet. This  "may help prevent gas or cramps.  Try to eat the same amount of fiber each day. Aim to get your fiber from nutritious foods. Supplements do not offer the same benefits as food.  Read food labels with care to learn how much fiber is in the food you are eating.  If possible, do not peel fruits or vegetables before you eat them. Eating the peel gives you more fiber.  Where can I learn more?  Eat Right  https://www.eatright.org/food/vitamins-and-supplements/types-of-vitamins-and-nutrients/easy-ways-to-boost-fiber-in-your-daily-diet  Last Reviewed Date  2021-09-23    Diverticulosis Discharge Instructions    About this topic  Diverticulosis is a problem of the large bowel or colon. The wall of the bowel becomes weak and pushes outward. They form balloon-like pouches called diverticula or \"tics.\" When you have hard stool, you strain to have a bowel movement. This raises the pressure in the bowel and causes pouches or bulges to form. Most often, they do not cause a problem. If they become infected, you have diverticulitis. If you have both bleeding and infection it is diverticular disease.    What care is needed at home?  Ask your doctor what you need to do when you go home. Make sure you ask questions if you do not understand what the doctor says.  Eat more whole grains, vegetables, and fruits.  Do not wait to have a bowel movement. Go as soon as you have the urge.  Drink 8 to 10 glasses of water each day. Talk to your doctor if you are drinking less fluids due to a health problem.  Be active. Walk, garden, or do something active for 30 minutes or more on most days of the week.  What follow-up care is needed?  Your doctor may ask you to make visits to the office to check on your progress. Be sure to keep these visits.  What drugs may be needed?  Most often with diverticulosis you will not need to take any drugs.  Will physical activity be limited?  When you are in pain, you may need to rest in bed. To ease the pain, use a " heat compress on your belly. This should last only for a few days.  What changes to diet are needed?  Talk to your doctor about any changes you need to make to your diet.  You do not need to avoid seeds, nuts, corn, or other similar foods.  You will need to eat food rich in fiber and drink more water.  Eat 5 or more servings of fresh fruits and vegetables every day.  Eat 6 or more servings of whole-wheat grain breads and cereals.  Try to get 25 to 30 grams of fiber every day. Read the labels to learn how much fiber is in foods.  Do not drink coffee, tea, or beer, wine, and mixed drinks (alcohol).  What problems could happen?  You may develop diverticulitis, which may cause:  Pockets or pouches in your bowel may be infected or filled with pus.  Hole or tear in your bowel  Part of your bowel to become narrow  You to need surgery  What can be done to prevent this health problem?  The best way to keep from having diverticulosis is to keep your bowel movements soft and normal. To keep more pouches from forming:  Talk with your doctor about adding an over-the-counter (OTC) fiber product to keep your stools soft.  Limit how much pain drugs you take. Overuse of some pain drugs can cause hard stools; talk with your doctor.  When do I need to call the doctor?  Signs of infection. These include a fever of 100.4°F (38°C) or higher, chills.  Mild pain or cramping in the lower part of the belly  A feeling of bloating in the belly  Belly pain that gets worse  Blood in your stool  Upset stomach or throwing up  Stools get too loose or too hard  Long-term hard stools  Teach Back: Helping You Understand  The Teach Back Method helps you understand the information we are giving you. After you talk with the staff, tell them in your own words what you learned. This helps to make sure the staff has described each thing clearly. It also helps to explain things that may have been confusing. Before going home, make sure you are able to do  these:  I can tell you about my condition.  I can tell you what changes I need to make with my diet or drugs.  I can tell you what I will do if I have pain or cramping in my lower belly or I have more belly pain.  Where can I learn more?  FamilyDoctor.org  http://familydoctor.org/familydoctor/en/diseases-conditions/diverticular-disease.html  [facility]  https://www.nhs.[place]/conditions/diverticular-disease-and-diverticulitis/  Last Reviewed Date  2021-04-13

## 2025-07-16 NOTE — Clinical Note
Huddle and Timeout completed together with team. Patient wristband and BEN information verified.  Anesthesia safety check completed. Patient was awake and oriented x4

## 2025-07-18 LAB
LAB AP ASR DISCLAIMER: NORMAL
LABORATORY COMMENT REPORT: NORMAL
PATH REPORT.COMMENTS IMP SPEC: NORMAL
PATH REPORT.FINAL DX SPEC: NORMAL
PATH REPORT.GROSS SPEC: NORMAL
PATH REPORT.RELEVANT HX SPEC: NORMAL
PATH REPORT.TOTAL CANCER: NORMAL

## 2025-07-21 LAB
LAB AP ASR DISCLAIMER: NORMAL
LABORATORY COMMENT REPORT: NORMAL
PATH REPORT.ADDENDUM SPEC: NORMAL
PATH REPORT.COMMENTS IMP SPEC: NORMAL
PATH REPORT.FINAL DX SPEC: NORMAL
PATH REPORT.GROSS SPEC: NORMAL
PATH REPORT.RELEVANT HX SPEC: NORMAL
PATH REPORT.TOTAL CANCER: NORMAL

## 2025-07-22 ENCOUNTER — APPOINTMENT (OUTPATIENT)
Dept: GASTROENTEROLOGY | Facility: HOSPITAL | Age: 75
End: 2025-07-22
Payer: MEDICARE

## 2025-07-23 ENCOUNTER — APPOINTMENT (OUTPATIENT)
Dept: GASTROENTEROLOGY | Facility: CLINIC | Age: 75
End: 2025-07-23
Payer: MEDICARE

## 2025-07-23 VITALS
HEIGHT: 71 IN | SYSTOLIC BLOOD PRESSURE: 136 MMHG | RESPIRATION RATE: 16 BRPM | BODY MASS INDEX: 26.74 KG/M2 | WEIGHT: 191 LBS | HEART RATE: 65 BPM | DIASTOLIC BLOOD PRESSURE: 78 MMHG | OXYGEN SATURATION: 95 %

## 2025-07-23 DIAGNOSIS — C15.5 MALIGNANT NEOPLASM OF LOWER THIRD OF ESOPHAGUS (MULTI): ICD-10-CM

## 2025-07-23 DIAGNOSIS — C15.5 MALIGNANT NEOPLASM OF LOWER THIRD OF ESOPHAGUS (MULTI): Primary | ICD-10-CM

## 2025-07-23 PROCEDURE — 1159F MED LIST DOCD IN RCRD: CPT | Performed by: INTERNAL MEDICINE

## 2025-07-23 PROCEDURE — 1036F TOBACCO NON-USER: CPT | Performed by: INTERNAL MEDICINE

## 2025-07-23 PROCEDURE — 99214 OFFICE O/P EST MOD 30 MIN: CPT | Performed by: INTERNAL MEDICINE

## 2025-07-23 PROCEDURE — 3078F DIAST BP <80 MM HG: CPT | Performed by: INTERNAL MEDICINE

## 2025-07-23 PROCEDURE — 3075F SYST BP GE 130 - 139MM HG: CPT | Performed by: INTERNAL MEDICINE

## 2025-07-23 NOTE — PROGRESS NOTES
Gastroenterology Office Visit     History of Present Illness:   Ilan Rosales is a 75 y.o. male who presents to GI clinic for follow up of dysphagia and esophageal mass.  Since last OV in 6/25 (saw Cat Yazmin for dysphagia), patient has had an EGD; see below for complete results.      Patient has had progressive dysphagia, and 30 lbs wt loss over past 4-5 months.  No hematemesis.  Has not been able to take pills since EGD.  Keeping fluids down, but even soft foods are hard to get down.  [facility] will cover liquid meds, and Boost/Ensure.      Had HB frequently when he was younger, especially with pizza.  Only took TUMS.  Never smoked.  Stopped EtOH in 1974.      EGD showed a distal esophageal mass- biopsies confirmed adenocarcinoma.     Patient is on Eliquis.  Wife and daughter with patient today.      Review of Systems  Constitutional: denies fever/ chills, night sweats, wt loss and fatigue  Respiratory: denies SOB, GUNTER  CV: denies chest pain and LE edema  Neuro: denies weakness and difficulty walking      Past Medical History   has a past medical history of Arthritis, Cataract, Clotting disorder (Multi), Colon polyp, COVID-19, Eczema, GERD (gastroesophageal reflux disease), H/O deep venous thrombosis, Hearing aid worn, HL (hearing loss), Hyperlipidemia, Hypertension, Joint pain, Polycythemia vera, and Wears glasses.     Problem List  Problem List[1]    Past Surgical History  Surgical History[2]    Social History   reports that he has never smoked. He has never used smokeless tobacco. He reports that he does not drink alcohol and does not use drugs.     Family History  family history includes Dementia in his mother; Heart attack in his father.       Allergies  RX Allergies[3]    Medications  Current Outpatient Medications   Medication Instructions    apixaban (Eliquis) 5 mg tablet 2 times daily    atorvastatin (LIPITOR) 20 mg, Daily RT    hydroxyurea (Hydrea) 500 mg capsule 1 capsule, 2 times daily    lisinopril 20  "mg, Daily    meloxicam (MOBIC) 15 mg, oral, Daily with breakfast    multivitamin tablet 1 tablet, Daily    pantoprazole (PROTONIX) 40 mg, oral, Daily    vitamins A,C,E-zinc-copper (PreserVision AREDS) 4,296 mcg-226 mg-90 mg capsule 1 capsule, 2 times daily        Objective   Blood pressure 136/78, pulse 65, resp. rate 16, height 1.803 m (5' 11\"), weight 86.6 kg (191 lb), SpO2 95%.        LABS  Component      Latest Ref Rng 6/17/2025   GLUCOSE      65 - 99 mg/dL 93    UREA NITROGEN (BUN)      7 - 25 mg/dL 20    CREATININE      0.70 - 1.28 mg/dL 1.39 (H)    EGFR      > OR = 60 mL/min/1.73m2 53 (L)    SODIUM      135 - 146 mmol/L 139    POTASSIUM      3.5 - 5.3 mmol/L 4.1    CHLORIDE      98 - 110 mmol/L 101    CARBON DIOXIDE      20 - 32 mmol/L 29    ELECTROLYTE BALANCE      7 - 17 mmol/L (calc) 9    CALCIUM      8.6 - 10.3 mg/dL 9.2    PROTEIN, TOTAL      6.1 - 8.1 g/dL 6.4    ALBUMIN      3.6 - 5.1 g/dL 4.2    BILIRUBIN, TOTAL      0.2 - 1.2 mg/dL 0.8    ALKALINE PHOSPHATASE      35 - 144 U/L 52    AST      10 - 35 U/L 22    ALT      9 - 46 U/L 14    WHITE BLOOD CELL COUNT      3.8 - 10.8 Thousand/uL 5.1    RED BLOOD CELL COUNT      4.20 - 5.80 Million/uL 2.96 (L)    HEMOGLOBIN      13.2 - 17.1 g/dL 12.8 (L)    HEMATOCRIT      38.5 - 50.0 % 36.3 (L)    MCV      80.0 - 100.0 fL 122.6 (H)    MCH      27.0 - 33.0 pg 43.2 (H)    MCHC      32.0 - 36.0 g/dL 35.3    RDW      11.0 - 15.0 % 14.2    PLATELET COUNT      140 - 400 Thousand/uL 289           Radiology  Ba esophagram 6/25: Mild-moderate dilation of the proximal and mid esophageal segments.  Segmental narrowing of the distal esophageal segment with transition  at the approximate T11 level which persisted throughout the exam  suggestive of stricture.    Endoscopy    Colonoscopy 7/25:   Few medium, scattered diverticula in the ascending colon and transverse colon  Multiple medium, extensive diverticula in the sigmoid colon  External and internal medium hemorrhoids " observed during retroflexion; no bleeding was observed  The exam was otherwise normal on both forward and retroflexed views.    EGD :   The duodenum appeared normal.  The stomach appeared normal.  Malignant-appearing, polypoid and ulcerated mass (not traversable) in the lower third of the esophagus; performed cold forceps biopsy. The gastroscope would not traverse the mass.   scope used, and I was able to traverse the mass.  It extended from 33-40 cm.  It was barely visible in the cardia on retroflexion.     FINAL DIAGNOSIS   A. ESOPHAGUS, DISTAL, MASS, BIOPSY:  INVASIVE ADENOCARCINOMA, MODERATELY DIFFERENTIATED (SEE COMMENT).   MMR present    Assessment/Plan   Ilan Rosales is a 75 y.o. male who presents to GI clinic for esophageal cancer.    Malignant neoplasm of lower third of esophagus (Multi)  1) refer to oncology  2) refer to CT surgery  3) will defer PET/CT to oncology  4) I spoke to advanced endoscopist, who thinks EUS would be of limited utility  5) Take Boost or Ensure 2-3x/d, stay hydrated  6) if this is inoperable, consider esophageal stent to resume eating         Iván Chua MD            [1]   Patient Active Problem List  Diagnosis    Colonic polyp    Eczema    Essential hypertension    Foreign body of left ear    Hyperlipidemia    Knee pain, left    Onychomycosis    Osteoarthritis    Pain in toes of both feet    Patient overweight    S/P total knee replacement using cement    Xerosis of skin    Malignant neoplasm of lower third of esophagus (Multi)   [2]   Past Surgical History:  Procedure Laterality Date    COLONOSCOPY      JOINT REPLACEMENT      KNEE ARTHROPLASTY Bilateral     TONSILLECTOMY     [3]   Allergies  Allergen Reactions    Bee Venom Protein (Honey Bee) Anaphylaxis and Shortness of breath     Throat swells

## 2025-07-23 NOTE — PATIENT INSTRUCTIONS
1) refer to oncology  2) refer to CT surgery  3) will defer PET/CT to oncology  4) I spoke to advanced endoscopist, who thinks EUS would be of limited utility  5) Take Boost or Ensure 2-3x/d, stay hydrated  6) if this is inoperable, consider esophageal stent to resume eating

## 2025-07-24 NOTE — PROGRESS NOTES
"HPI:   Ilan Rosales is a 75 y.o. male with a pmhx of HTN, HLD, OA, polycythemia vera, and DVT on Eliquis who is referred to me by Dr Chua for evaluation and treatment of esophageal cancer. He developed 4 month of dysphagia. Initially the dysphagia is to solid food. Now he could only tolerate liquid. He has about 30lb unintentional weight loss. He endorsed longstanding GERD which is controlled with PPIs. He was a never smoker. He denied MI or stroke. He remained active at home. He endorsed overall in good health. He denied fever, chills, chest pain, chest pressure, nausea or emesis.    PMHx: per HPI  PSHx: tonsillectomy, knee replacement  SHx: never smoker, social ETOH, deny illicit drugs   FMHx: father has MI, aunt has breast cancer and lung cancer, sister has breast cancer  Current Medications[1]   RX Allergies[2]       ROS  General: negative for fever, chills, weight loss, night sweat  Head: negative for severe headache, vision change, blurred vision,   CV: negative for chest pain, dizziness, lightheadedness   Pulm: negative for shortness of breath, dyspnea on exertion, hemoptysis  GI: negative for diarrhea, constipation, abdominal pain, nausea or vomiting, BRBPR  : negative for dysuria, hematuria, incontinence  Skin: negative for rash  Heme: blood thinner, negative for bleeding disorder or clotting disorder  Endo: negative for heat or cold intolerance, weight gain or weight loss  MSK: negative for rash, edema, weakness    PHYSICAL EXAM  /68   Pulse 71   Temp 36.7 °C (98 °F)   Ht 1.803 m (5' 11\")   Wt 86.2 kg (190 lb)   SpO2 97%   BMI 26.50 kg/m²    Constitution: well-developed well-nourished male sitting in chair in no acute distress  HEENT: NCAT, moist mucosal membrane, neck supple, no crepitus, sclera anicteric  Lymph nodes: no cervical or supraclavicular lymphadenopathy  Cardiac: RRR, normal S1, S2, no mrg  Pulmonary: normal air movement, CTAP, no wcr  Abdomen: soft, non-distended, " non-tender, no rigidity, guarding or rebound tenderness, no splenohepatomegaly  Neuro: AOx3, CNII-XII grossly normal  Ext: warm, dry, no edema noted  Skin: dry, clean and intact  Psych: mood and affect wnl    Assessment and Plan:  This is a 75 y.o. male with new distal esophageal cancer  I reviewed his EGD from 25. Esophageal mass was seen in the lower third of esophagus from 33-40cm. Mass was obstructing. Could only traverse with  scope. No stomach involvement.   I reviewed his colonoscopy from 25. Diverticulosis was seen. Otherwise normal.   I reviewed surgical pathology from 25. Esophageal biopsy showed invasive adenocarcinoma moderately differentiated. MMR intact.   I reviewed the labs from 25. It showed hgb of 13. Mild creatinine elevation to 1.4    In my opinion, this patient presents with newly diagnosed distal esophageal cancer with dysphgia.  Overall he is in good health without major comorbidities.  I recommend PET/CT to complete staging.  Based on the final stage of disease, we will finalize treatment plan which could be definitive chemoradiation or perioperative chemotherapy with surgical resection.  I will present his case on tumor board.      Given his dysphagia and weight loss, I think he would benefit from feeding tube placement prior to treatment start.  The option of feeding tube will depend on the stage of disease.  It could be a PEG or jejunostomy.    I had a candid discussion with the patient. I outlined the treatment plan as above. The patient consented to proceed.     Ellen Jones MD  Thoracic Surgeon  [facility]   [facility]  [facility]  Office phone: [phone]  Fax: [phone]  Pager: [phone]    Amount of time spent:  -Prep time on date of patient encounter: 30 min  -Time spend with patient/family/caregiver: 30 min  -Additional time spent  on patient care activities: 15 min  -Documentation time in medical record: 15 min  -Other time spent on date of patient encounter: 0 min  Total time: 90 min         [1]   Current Outpatient Medications:     apixaban (Eliquis) 5 mg tablet, Take by mouth 2 times a day., Disp: , Rfl:     atorvastatin (Lipitor) 40 mg tablet, Take 0.5 tablets (20 mg) by mouth once daily., Disp: , Rfl:     hydroxyurea (Hydrea) 500 mg capsule, Take 1 capsule (500 mg total) by mouth 2 times a day., Disp: , Rfl:     lisinopril 20 mg tablet, Take 1 tablet (20 mg) by mouth once daily., Disp: , Rfl:     meloxicam (Mobic) 15 mg tablet, Take 1 tablet (15 mg) by mouth once daily with breakfast., Disp: 30 tablet, Rfl: 1    multivitamin tablet, Take 1 tablet by mouth once daily., Disp: , Rfl:     pantoprazole (ProtoNix) 40 mg EC tablet, Take 1 tablet (40 mg) by mouth once daily., Disp: 30 tablet, Rfl: 3    vitamins A,C,E-zinc-copper (PreserVision AREDS) 4,296 mcg-226 mg-90 mg capsule, Take 1 capsule by mouth twice a day., Disp: , Rfl:   [2]   Allergies  Allergen Reactions    Bee Venom Protein (Honey Bee) Anaphylaxis and Shortness of breath     Throat swells

## 2025-07-24 NOTE — H&P (VIEW-ONLY)
"HPI:   Ilan Rosales is a 75 y.o. male with a pmhx of HTN, HLD, OA, polycythemia vera, and DVT on Eliquis who is referred to me by Dr Chua for evaluation and treatment of esophageal cancer. He developed 4 month of dysphagia. Initially the dysphagia is to solid food. Now he could only tolerate liquid. He has about 30lb unintentional weight loss. He endorsed longstanding GERD which is controlled with PPIs. He was a never smoker. He denied MI or stroke. He remained active at home. He endorsed overall in good health. He denied fever, chills, chest pain, chest pressure, nausea or emesis.    PMHx: per HPI  PSHx: tonsillectomy, knee replacement  SHx: never smoker, social ETOH, deny illicit drugs   FMHx: father has MI, aunt has breast cancer and lung cancer, sister has breast cancer  Current Medications[1]   RX Allergies[2]       ROS  General: negative for fever, chills, weight loss, night sweat  Head: negative for severe headache, vision change, blurred vision,   CV: negative for chest pain, dizziness, lightheadedness   Pulm: negative for shortness of breath, dyspnea on exertion, hemoptysis  GI: negative for diarrhea, constipation, abdominal pain, nausea or vomiting, BRBPR  : negative for dysuria, hematuria, incontinence  Skin: negative for rash  Heme: blood thinner, negative for bleeding disorder or clotting disorder  Endo: negative for heat or cold intolerance, weight gain or weight loss  MSK: negative for rash, edema, weakness    PHYSICAL EXAM  /68   Pulse 71   Temp 36.7 °C (98 °F)   Ht 1.803 m (5' 11\")   Wt 86.2 kg (190 lb)   SpO2 97%   BMI 26.50 kg/m²    Constitution: well-developed well-nourished male sitting in chair in no acute distress  HEENT: NCAT, moist mucosal membrane, neck supple, no crepitus, sclera anicteric  Lymph nodes: no cervical or supraclavicular lymphadenopathy  Cardiac: RRR, normal S1, S2, no mrg  Pulmonary: normal air movement, CTAP, no wcr  Abdomen: soft, non-distended, " non-tender, no rigidity, guarding or rebound tenderness, no splenohepatomegaly  Neuro: AOx3, CNII-XII grossly normal  Ext: warm, dry, no edema noted  Skin: dry, clean and intact  Psych: mood and affect wnl    Assessment and Plan:  This is a 75 y.o. male with new distal esophageal cancer  I reviewed his EGD from 25. Esophageal mass was seen in the lower third of esophagus from 33-40cm. Mass was obstructing. Could only traverse with  scope. No stomach involvement.   I reviewed his colonoscopy from 25. Diverticulosis was seen. Otherwise normal.   I reviewed surgical pathology from 25. Esophageal biopsy showed invasive adenocarcinoma moderately differentiated. MMR intact.   I reviewed the labs from 25. It showed hgb of 13. Mild creatinine elevation to 1.4    In my opinion, this patient presents with newly diagnosed distal esophageal cancer with dysphgia.  Overall he is in good health without major comorbidities.  I recommend PET/CT to complete staging.  Based on the final stage of disease, we will finalize treatment plan which could be definitive chemoradiation or perioperative chemotherapy with surgical resection.  I will present his case on tumor board.      Given his dysphagia and weight loss, I think he would benefit from feeding tube placement prior to treatment start.  The option of feeding tube will depend on the stage of disease.  It could be a PEG or jejunostomy.    I had a candid discussion with the patient. I outlined the treatment plan as above. The patient consented to proceed.     Ellen Jones MD  Thoracic Surgeon  [facility]   of Medicine  [facility]  Office phone: [phone]  Fax: [phone]  Pager: [phone]    Amount of time spent:  -Prep time on date of patient encounter: 30 min  -Time spend with patient/family/caregiver: 30 min  -Additional time spent  on patient care activities: 15 min  -Documentation time in medical record: 15 min  -Other time spent on date of patient encounter: 0 min  Total time: 90 min         [1]   Current Outpatient Medications:     apixaban (Eliquis) 5 mg tablet, Take by mouth 2 times a day., Disp: , Rfl:     atorvastatin (Lipitor) 40 mg tablet, Take 0.5 tablets (20 mg) by mouth once daily., Disp: , Rfl:     hydroxyurea (Hydrea) 500 mg capsule, Take 1 capsule (500 mg total) by mouth 2 times a day., Disp: , Rfl:     lisinopril 20 mg tablet, Take 1 tablet (20 mg) by mouth once daily., Disp: , Rfl:     meloxicam (Mobic) 15 mg tablet, Take 1 tablet (15 mg) by mouth once daily with breakfast., Disp: 30 tablet, Rfl: 1    multivitamin tablet, Take 1 tablet by mouth once daily., Disp: , Rfl:     pantoprazole (ProtoNix) 40 mg EC tablet, Take 1 tablet (40 mg) by mouth once daily., Disp: 30 tablet, Rfl: 3    vitamins A,C,E-zinc-copper (PreserVision AREDS) 4,296 mcg-226 mg-90 mg capsule, Take 1 capsule by mouth twice a day., Disp: , Rfl:   [2]   Allergies  Allergen Reactions    Bee Venom Protein (Honey Bee) Anaphylaxis and Shortness of breath     Throat swells

## 2025-07-25 ENCOUNTER — OFFICE VISIT (OUTPATIENT)
Dept: SURGERY | Facility: CLINIC | Age: 75
End: 2025-07-25
Payer: MEDICARE

## 2025-07-25 VITALS
OXYGEN SATURATION: 97 % | DIASTOLIC BLOOD PRESSURE: 68 MMHG | HEART RATE: 71 BPM | BODY MASS INDEX: 26.6 KG/M2 | WEIGHT: 190 LBS | HEIGHT: 71 IN | TEMPERATURE: 98 F | SYSTOLIC BLOOD PRESSURE: 115 MMHG

## 2025-07-25 DIAGNOSIS — C15.5 MALIGNANT NEOPLASM OF LOWER THIRD OF ESOPHAGUS (MULTI): Primary | ICD-10-CM

## 2025-07-25 PROCEDURE — 1160F RVW MEDS BY RX/DR IN RCRD: CPT | Performed by: STUDENT IN AN ORGANIZED HEALTH CARE EDUCATION/TRAINING PROGRAM

## 2025-07-25 PROCEDURE — 99215 OFFICE O/P EST HI 40 MIN: CPT | Performed by: STUDENT IN AN ORGANIZED HEALTH CARE EDUCATION/TRAINING PROGRAM

## 2025-07-25 PROCEDURE — 1159F MED LIST DOCD IN RCRD: CPT | Performed by: STUDENT IN AN ORGANIZED HEALTH CARE EDUCATION/TRAINING PROGRAM

## 2025-07-25 PROCEDURE — 3078F DIAST BP <80 MM HG: CPT | Performed by: STUDENT IN AN ORGANIZED HEALTH CARE EDUCATION/TRAINING PROGRAM

## 2025-07-25 PROCEDURE — 3074F SYST BP LT 130 MM HG: CPT | Performed by: STUDENT IN AN ORGANIZED HEALTH CARE EDUCATION/TRAINING PROGRAM

## 2025-07-25 PROCEDURE — G2211 COMPLEX E/M VISIT ADD ON: HCPCS | Performed by: STUDENT IN AN ORGANIZED HEALTH CARE EDUCATION/TRAINING PROGRAM

## 2025-07-25 PROCEDURE — 1036F TOBACCO NON-USER: CPT | Performed by: STUDENT IN AN ORGANIZED HEALTH CARE EDUCATION/TRAINING PROGRAM

## 2025-07-25 PROCEDURE — 99205 OFFICE O/P NEW HI 60 MIN: CPT | Performed by: STUDENT IN AN ORGANIZED HEALTH CARE EDUCATION/TRAINING PROGRAM

## 2025-07-25 NOTE — LETTER
July 25, 2025     Iván Chua MD  [address]  [facility], [address]  [facility] [address]    Patient: Ilan Rosales   YOB: 1950   Date of Visit: 7/25/2025       Dear Dr. Iván Chua MD:    Thank you for referring Ilan Rosales to me for evaluation. Below are my notes for this consultation.  If you have questions, please do not hesitate to call me. Thank you for involving me in the care of this patient.      Sincerely,     Ellen Jones MD  [phone]      CC: Elliot Orellana MD  ______________________________________________________________________________________    HPI:   Ilan Rosales is a 75 y.o. male with a pmhx of HTN, HLD, OA, polycythemia vera, and DVT on Eliquis who is referred to me by Dr Chua for evaluation and treatment of esophageal cancer. He developed 4 month of dysphagia. Initially the dysphagia is to solid food. Now he could only tolerate liquid. He has about 30lb unintentional weight loss. He endorsed longstanding GERD which is controlled with PPIs. He was a never smoker. He denied MI or stroke. He remained active at home. He endorsed overall in good health. He denied fever, chills, chest pain, chest pressure, nausea or emesis.    PMHx: per HPI  PSHx: tonsillectomy, knee replacement  SHx: never smoker, social ETOH, deny illicit drugs   FMHx: father has MI, aunt has breast cancer and lung cancer, sister has breast cancer  Current Medications[1]   RX Allergies[2]       ROS  General: negative for fever, chills, weight loss, night sweat  Head: negative for severe headache, vision change, blurred vision,   CV: negative for chest pain, dizziness, lightheadedness   Pulm: negative for shortness of breath, dyspnea on exertion, hemoptysis  GI: negative for diarrhea, constipation, abdominal pain, nausea or vomiting, BRBPR  : negative for dysuria, hematuria, incontinence  Skin: negative for rash  Heme: blood thinner, negative for bleeding disorder or  "clotting disorder  Endo: negative for heat or cold intolerance, weight gain or weight loss  MSK: negative for rash, edema, weakness    PHYSICAL EXAM  /68   Pulse 71   Temp 36.7 °C (98 °F)   Ht 1.803 m (5' 11\")   Wt 86.2 kg (190 lb)   SpO2 97%   BMI 26.50 kg/m²    Constitution: well-developed well-nourished male sitting in chair in no acute distress  HEENT: NCAT, moist mucosal membrane, neck supple, no crepitus, sclera anicteric  Lymph nodes: no cervical or supraclavicular lymphadenopathy  Cardiac: RRR, normal S1, S2, no mrg  Pulmonary: normal air movement, CTAP, no wcr  Abdomen: soft, non-distended, non-tender, no rigidity, guarding or rebound tenderness, no splenohepatomegaly  Neuro: AOx3, CNII-XII grossly normal  Ext: warm, dry, no edema noted  Skin: dry, clean and intact  Psych: mood and affect wnl    Assessment and Plan:  This is a 75 y.o. male with new distal esophageal cancer  I reviewed his EGD from 25. Esophageal mass was seen in the lower third of esophagus from 33-40cm. Mass was obstructing. Could only traverse with  scope. No stomach involvement.   I reviewed his colonoscopy from 25. Diverticulosis was seen. Otherwise normal.   I reviewed surgical pathology from 25. Esophageal biopsy showed invasive adenocarcinoma moderately differentiated. MMR intact.   I reviewed the labs from 25. It showed hgb of 13. Mild creatinine elevation to 1.4    In my opinion, this patient presents with newly diagnosed distal esophageal cancer with dysphgia.  Overall he is in good health without major comorbidities.  I recommend PET/CT to complete staging.  Based on the final stage of disease, we will finalize treatment plan which could be definitive chemoradiation or perioperative chemotherapy with surgical resection.  I will present his case on tumor board.      Given his dysphagia and weight loss, I think he would benefit from feeding tube placement prior to treatment start.  The option " of feeding tube will depend on the stage of disease.  It could be a PEG or jejunostomy.    I had a candid discussion with the patient. I outlined the treatment plan as above. The patient consented to proceed.     Ellen Jones MD  Thoracic Surgeon  [facility]   of Medicine  [facility]  Office phone: [phone]  Fax: [phone]  Pager: [phone]    Amount of time spent:  -Prep time on date of patient encounter: 30 min  -Time spend with patient/family/caregiver: 30 min  -Additional time spent on patient care activities: 15 min  -Documentation time in medical record: 15 min  -Other time spent on date of patient encounter: 0 min  Total time: 90 min         [1]    Current Outpatient Medications:   •  apixaban (Eliquis) 5 mg tablet, Take by mouth 2 times a day., Disp: , Rfl:   •  atorvastatin (Lipitor) 40 mg tablet, Take 0.5 tablets (20 mg) by mouth once daily., Disp: , Rfl:   •  hydroxyurea (Hydrea) 500 mg capsule, Take 1 capsule (500 mg total) by mouth 2 times a day., Disp: , Rfl:   •  lisinopril 20 mg tablet, Take 1 tablet (20 mg) by mouth once daily., Disp: , Rfl:   •  meloxicam (Mobic) 15 mg tablet, Take 1 tablet (15 mg) by mouth once daily with breakfast., Disp: 30 tablet, Rfl: 1  •  multivitamin tablet, Take 1 tablet by mouth once daily., Disp: , Rfl:   •  pantoprazole (ProtoNix) 40 mg EC tablet, Take 1 tablet (40 mg) by mouth once daily., Disp: 30 tablet, Rfl: 3  •  vitamins A,C,E-zinc-copper (PreserVision AREDS) 4,296 mcg-226 mg-90 mg capsule, Take 1 capsule by mouth twice a day., Disp: , Rfl:   [2]  Allergies  Allergen Reactions   • Bee Venom Protein (Honey Bee) Anaphylaxis and Shortness of breath     Throat swells          [1]    Current Outpatient Medications:   •  apixaban (Eliquis) 5 mg tablet, Take by mouth 2 times a day., Disp: , Rfl:   •  atorvastatin (Lipitor) 40 mg tablet, Take 0.5  tablets (20 mg) by mouth once daily., Disp: , Rfl:   •  hydroxyurea (Hydrea) 500 mg capsule, Take 1 capsule (500 mg total) by mouth 2 times a day., Disp: , Rfl:   •  lisinopril 20 mg tablet, Take 1 tablet (20 mg) by mouth once daily., Disp: , Rfl:   •  meloxicam (Mobic) 15 mg tablet, Take 1 tablet (15 mg) by mouth once daily with breakfast., Disp: 30 tablet, Rfl: 1  •  multivitamin tablet, Take 1 tablet by mouth once daily., Disp: , Rfl:   •  pantoprazole (ProtoNix) 40 mg EC tablet, Take 1 tablet (40 mg) by mouth once daily., Disp: 30 tablet, Rfl: 3  •  vitamins A,C,E-zinc-copper (PreserVision AREDS) 4,296 mcg-226 mg-90 mg capsule, Take 1 capsule by mouth twice a day., Disp: , Rfl:   [2]  Allergies  Allergen Reactions   • Bee Venom Protein (Honey Bee) Anaphylaxis and Shortness of breath     Throat swells

## 2025-07-29 ENCOUNTER — HOSPITAL ENCOUNTER (OUTPATIENT)
Dept: RADIOLOGY | Facility: CLINIC | Age: 75
Discharge: HOME | End: 2025-07-29
Payer: MEDICARE

## 2025-07-29 DIAGNOSIS — C15.5 MALIGNANT NEOPLASM OF LOWER THIRD OF ESOPHAGUS (MULTI): ICD-10-CM

## 2025-07-29 PROCEDURE — 3430000001 HC RX 343 DIAGNOSTIC RADIOPHARMACEUTICALS: Performed by: STUDENT IN AN ORGANIZED HEALTH CARE EDUCATION/TRAINING PROGRAM

## 2025-07-29 PROCEDURE — 78815 PET IMAGE W/CT SKULL-THIGH: CPT | Mod: PET TUMOR INIT TX STRAT | Performed by: INTERNAL MEDICINE

## 2025-07-29 PROCEDURE — A9552 F18 FDG: HCPCS | Performed by: STUDENT IN AN ORGANIZED HEALTH CARE EDUCATION/TRAINING PROGRAM

## 2025-07-29 PROCEDURE — 78815 PET IMAGE W/CT SKULL-THIGH: CPT | Mod: PI

## 2025-07-29 RX ORDER — FLUDEOXYGLUCOSE F 18 200 MCI/ML
13.5 INJECTION, SOLUTION INTRAVENOUS
Status: COMPLETED | OUTPATIENT
Start: 2025-07-29 | End: 2025-07-29

## 2025-07-29 RX ADMIN — FLUDEOXYGLUCOSE F 18 13.5 MILLICURIE: 200 INJECTION, SOLUTION INTRAVENOUS at 09:08

## 2025-07-29 NOTE — TUMOR BOARD NOTE
MULTIDISCIPLINARY GI TUMOR BOARD CONFERENCE NOTE  Ilan Rosales was presented at GI Tumor Board Conference  Conference date: 7/30/2025  Presenting Provider(s): Dr. Ellen Jones  Present at Conference: Medical Oncology, Radiation Oncology, Surgical Oncology, Radiology, and Pathology Representatives  Conference Review Type: Radiology Review     Impression:  Ilan Rosales is a 75 y.o. male patient who presents with stage IV distal esophageal cancer      PET CT 07/29/25  IMPRESSION:  1. Soft tissue mass within the distal esophageal lumen with  associated luminal distention, findings which are consistent with  patient's known primary esophageal adenocarcinoma. There is resultant  upstream at least partial obstruction/pseudoachalasia with fluid  layering dependently in the mid and proximal esophagus. Recommend  aspiration precautions.  2. Multiple hypodense hepatic lesions with a corresponding  hypermetabolic uptake and hypermetabolic prevascular and  gastrohepatic lymph nodes, findings which are concerning for  intrathoracic and intra-abdominal solid organ and edel metastatic  disease.  3. Hypermetabolic foci throughout the axial and appendicular  skeleton, including the right humeral head, T11 vertebral body, and  pelvis, suggestive of osseous metastatic disease.    Tumor Board Stage:    Mismatch Repair Status: proficient    National Guidelines discussed: Yes  Recommendations: Consider phase 1 study if available.          Disclaimer  SCC tumor board recommendations represent the consensus opinion of physicians present at a weekly patient care conference. The treating SCC physician is not always present, and many of the physicians formulating the recommendation have not personally seen or examined the patient under discussion. It is understood that the treating SCC physician considers the expertise of the Tumor Board Recommendation in formulating his/her plan for the patient. However, in many situations,  based on individualized patient considerations, a different plan is determined by the treating physician to be the optimal medical management.    Scribe Attestation  By signing my name below, I, Lazaro Sethi   attest that this documentation has been prepared under the direction and in the presence of GASTROINTESTINAL TUMOR BOARD.

## 2025-07-30 ENCOUNTER — TELEMEDICINE (OUTPATIENT)
Dept: SURGERY | Facility: HOSPITAL | Age: 75
End: 2025-07-30
Payer: MEDICARE

## 2025-07-30 ENCOUNTER — TUMOR BOARD CONFERENCE (OUTPATIENT)
Dept: HEMATOLOGY/ONCOLOGY | Facility: HOSPITAL | Age: 75
End: 2025-07-30
Payer: MEDICARE

## 2025-07-30 DIAGNOSIS — C79.9 METASTASIS FROM ESOPHAGEAL CANCER (MULTI): Primary | ICD-10-CM

## 2025-07-30 DIAGNOSIS — C15.9 METASTASIS FROM ESOPHAGEAL CANCER (MULTI): Primary | ICD-10-CM

## 2025-07-30 PROCEDURE — 1160F RVW MEDS BY RX/DR IN RCRD: CPT | Performed by: STUDENT IN AN ORGANIZED HEALTH CARE EDUCATION/TRAINING PROGRAM

## 2025-07-30 PROCEDURE — 1159F MED LIST DOCD IN RCRD: CPT | Performed by: STUDENT IN AN ORGANIZED HEALTH CARE EDUCATION/TRAINING PROGRAM

## 2025-07-30 PROCEDURE — 99215 OFFICE O/P EST HI 40 MIN: CPT | Performed by: STUDENT IN AN ORGANIZED HEALTH CARE EDUCATION/TRAINING PROGRAM

## 2025-07-30 PROCEDURE — G2211 COMPLEX E/M VISIT ADD ON: HCPCS | Performed by: STUDENT IN AN ORGANIZED HEALTH CARE EDUCATION/TRAINING PROGRAM

## 2025-07-30 NOTE — PROGRESS NOTES
Subjective   Ilan Rosales  is a 75 y.o. male with a pmhx of HTN, HLD, OA, polycythemia vera, DVT on Eliquis and newly diagnosed distal esophageal cancer who presents today for virtual follow up. He has completed PET-CT. He continues to have dysphagia. He could only tolerate clear liquid now. Full liquid such as cream of wheat will also regurgitate back. He did not have any aspiration event.     There is no significant change in patient's past medical history, past surgical history, social history, family history, or review of systems since last visit.   Medical History[1]  Surgical History[2]  Family History[3]  Social History     Socioeconomic History    Marital status:      Spouse name: Not on file    Number of children: Not on file    Years of education: Not on file    Highest education level: Not on file   Occupational History    Not on file   Tobacco Use    Smoking status: Never    Smokeless tobacco: Never   Vaping Use    Vaping status: Never Used   Substance and Sexual Activity    Alcohol use: Never    Drug use: Never    Sexual activity: Defer   Other Topics Concern    Not on file   Social History Narrative    Not on file     Social Drivers of Health     Financial Resource Strain: Low Risk  (2/21/2023)    Received from [facility]    Overall Financial Resource Strain (CARDIA)     Difficulty of Paying Living Expenses: Not hard at all   Food Insecurity: No Food Insecurity (2/21/2023)    Received from [facility]    Hunger Vital Sign     Within the past 12 months, you worried that your food would run out before you got the money to buy more.: Never true     Within the past 12 months, the food you bought just didn't last and you didn't have money to get more.: Never true   Transportation Needs: Unknown (2/21/2023)    Received from [facility]    PRAPARE - Transportation     Lack of Transportation (Medical): Not on file     Lack of  Transportation (Non-Medical): No   Physical Activity: Not on file   Stress: Not on file   Social Connections: Not on file   Intimate Partner Violence: Not on file   Housing Stability: Unknown (2023)    Received from [facility]    Housing Stability Vital Sign     Unable to Pay for Housing in the Last Year: Not on file     Number of Places Lived in the Last Year: Not on file     Unstable Housing in the Last Year: No     Current Medications[4]   RX Allergies[5]     Objective   This is a audio only visit. Patient is alert and oriented. Speak in full sentence without shortness of breath    Diagnostic Review  I reviewed PET-CT from 25. Distal esophageal mass is highly metabolic active. There were multiple spots of metabolic active lesions in the liver and bones consistent with metastatic disease.   I reviewed his EGD from 25. Esophageal mass was seen in the lower third of esophagus from 33-40cm. Mass was obstructing. Could only traverse with  scope. No stomach involvement.   I reviewed his colonoscopy from 25. Diverticulosis was seen. Otherwise normal.   I reviewed surgical pathology from 25. Esophageal biopsy showed invasive adenocarcinoma moderately differentiated. MMR intact.   I reviewed the labs from 25. It showed hgb of 13. Mild creatinine elevation to 1.4      Assessment/Plan   Ilan Rosales  is doing well. He has completed staging workup. Clinically stage IV esophageal adenocarcinoma. I recommended definitive chemotherapy. He is seeing Dr Venegas from medical oncology soon. In term of his dysphagia, I think a course of palliative radiation is reasonable and I recommend feeding tube placement as well. I will plan for EGD PEG on Monday at [facility]. If tumor is obstructing and EGD is not possible, I will plan for an open gastrostomy tube.     I will see if my general surgery colleague can do medi-port at the same time of the feeding tube.     Ellen  MD Robert  Thoracic Surgeon  [facility]   of Medicine  [facility]  Office phone: [phone]  Fax: [phone]  Pager: [phone]    An interactive audio only telecommunication system which permits real time communications between the patient (at the originating site) and provider (at the distant site) was utilized to provide this telehealth service. The patient is unwilling to consent to a video visit but prefers audio visit only.     Amount of time spent:  -Prep time on date of patient encounter: 15 min  -Time spend with patient/family/caregiver: 15 min  -Additional time spent on patient care activities: 0 min  -Documentation time in medical record: 10 min  -Other time spent on date of patient encounter: 0 min  Total time: 40 min         [1]   Past Medical History:  Diagnosis Date    Arthritis     Cataract     Clotting disorder (Multi)     Colon polyp     COVID-19     VACCINATED    Eczema     GERD (gastroesophageal reflux disease)     H/O deep venous thrombosis     Hearing aid worn     HL (hearing loss)     Hyperlipidemia     Hypertension     Joint pain     Polycythemia vera     Wears glasses    [2]   Past Surgical History:  Procedure Laterality Date    COLONOSCOPY      JOINT REPLACEMENT      KNEE ARTHROPLASTY Bilateral     TONSILLECTOMY     [3]   Family History  Problem Relation Name Age of Onset    Dementia Mother      Heart attack Father     [4]   Current Outpatient Medications:     apixaban (Eliquis) 5 mg tablet, Take by mouth 2 times a day., Disp: , Rfl:     atorvastatin (Lipitor) 40 mg tablet, Take 0.5 tablets (20 mg) by mouth once daily., Disp: , Rfl:     hydroxyurea (Hydrea) 500 mg capsule, Take 1 capsule (500 mg total) by mouth 2 times a day., Disp: , Rfl:     lisinopril 20 mg tablet, Take 1 tablet (20 mg) by mouth once daily., Disp: , Rfl:     meloxicam (Mobic) 15 mg tablet, Take 1  tablet (15 mg) by mouth once daily with breakfast., Disp: 30 tablet, Rfl: 1    multivitamin tablet, Take 1 tablet by mouth once daily., Disp: , Rfl:     pantoprazole (ProtoNix) 40 mg EC tablet, Take 1 tablet (40 mg) by mouth once daily., Disp: 30 tablet, Rfl: 3    vitamins A,C,E-zinc-copper (PreserVision AREDS) 4,296 mcg-226 mg-90 mg capsule, Take 1 capsule by mouth twice a day., Disp: , Rfl:   No current facility-administered medications for this visit.  [5]   Allergies  Allergen Reactions    Bee Venom Protein (Honey Bee) Anaphylaxis and Shortness of breath     Throat swells

## 2025-07-30 NOTE — PREPROCEDURE INSTRUCTIONS

## 2025-08-01 ENCOUNTER — LAB (OUTPATIENT)
Dept: LAB | Facility: CLINIC | Age: 75
End: 2025-08-01
Payer: MEDICARE

## 2025-08-01 ENCOUNTER — OFFICE VISIT (OUTPATIENT)
Dept: HEMATOLOGY/ONCOLOGY | Facility: CLINIC | Age: 75
End: 2025-08-01
Payer: MEDICARE

## 2025-08-01 ENCOUNTER — EDUCATION (OUTPATIENT)
Dept: HEMATOLOGY/ONCOLOGY | Facility: CLINIC | Age: 75
End: 2025-08-01
Payer: MEDICARE

## 2025-08-01 ENCOUNTER — APPOINTMENT (OUTPATIENT)
Dept: SURGERY | Facility: CLINIC | Age: 75
End: 2025-08-01
Payer: MEDICARE

## 2025-08-01 VITALS
WEIGHT: 181.22 LBS | RESPIRATION RATE: 18 BRPM | HEART RATE: 78 BPM | TEMPERATURE: 98.2 F | DIASTOLIC BLOOD PRESSURE: 76 MMHG | OXYGEN SATURATION: 95 % | SYSTOLIC BLOOD PRESSURE: 132 MMHG | HEIGHT: 71 IN | BODY MASS INDEX: 25.37 KG/M2

## 2025-08-01 DIAGNOSIS — D45 POLYCYTHEMIA VERA: ICD-10-CM

## 2025-08-01 DIAGNOSIS — I82.509 CHRONIC DEEP VEIN THROMBOSIS (DVT) OF LOWER EXTREMITY, UNSPECIFIED LATERALITY, UNSPECIFIED VEIN: ICD-10-CM

## 2025-08-01 DIAGNOSIS — C15.9 METASTASIS FROM ESOPHAGEAL CANCER (MULTI): ICD-10-CM

## 2025-08-01 DIAGNOSIS — C79.9 METASTASIS FROM ESOPHAGEAL CANCER (MULTI): ICD-10-CM

## 2025-08-01 DIAGNOSIS — C15.5 MALIGNANT NEOPLASM OF LOWER THIRD OF ESOPHAGUS (MULTI): Primary | ICD-10-CM

## 2025-08-01 DIAGNOSIS — T45.1X5A CHEMOTHERAPY INDUCED NAUSEA AND VOMITING: Primary | ICD-10-CM

## 2025-08-01 DIAGNOSIS — R11.2 CHEMOTHERAPY INDUCED NAUSEA AND VOMITING: Primary | ICD-10-CM

## 2025-08-01 DIAGNOSIS — E78.2 MIXED HYPERLIPIDEMIA: ICD-10-CM

## 2025-08-01 LAB
ALBUMIN SERPL BCP-MCNC: 4.1 G/DL (ref 3.4–5)
ALP SERPL-CCNC: 122 U/L (ref 33–136)
ALT SERPL W P-5'-P-CCNC: 35 U/L (ref 10–52)
ANION GAP SERPL CALC-SCNC: 12 MMOL/L (ref 10–20)
AST SERPL W P-5'-P-CCNC: 43 U/L (ref 9–39)
BASOPHILS # BLD AUTO: 0.09 X10*3/UL (ref 0–0.1)
BASOPHILS NFR BLD AUTO: 0.6 %
BILIRUB SERPL-MCNC: 0.7 MG/DL (ref 0–1.2)
BUN SERPL-MCNC: 17 MG/DL (ref 6–23)
CALCIUM SERPL-MCNC: 9.9 MG/DL (ref 8.6–10.3)
CHLORIDE SERPL-SCNC: 100 MMOL/L (ref 98–107)
CO2 SERPL-SCNC: 31 MMOL/L (ref 21–32)
CREAT SERPL-MCNC: 1.64 MG/DL (ref 0.5–1.3)
EGFRCR SERPLBLD CKD-EPI 2021: 43 ML/MIN/1.73M*2
EOSINOPHIL # BLD AUTO: 0.07 X10*3/UL (ref 0–0.4)
EOSINOPHIL NFR BLD AUTO: 0.4 %
ERYTHROCYTE [DISTWIDTH] IN BLOOD BY AUTOMATED COUNT: 14.4 % (ref 11.5–14.5)
GIANT PLATELETS BLD QL SMEAR: NORMAL
GLUCOSE SERPL-MCNC: 97 MG/DL (ref 74–99)
HCT VFR BLD AUTO: 39.3 % (ref 41–52)
HGB BLD-MCNC: 12.9 G/DL (ref 13.5–17.5)
IMM GRANULOCYTES # BLD AUTO: 0.27 X10*3/UL (ref 0–0.5)
IMM GRANULOCYTES NFR BLD AUTO: 1.7 % (ref 0–0.9)
LYMPHOCYTES # BLD AUTO: 0.39 X10*3/UL (ref 0.8–3)
LYMPHOCYTES NFR BLD AUTO: 2.5 %
MCH RBC QN AUTO: 39.4 PG (ref 26–34)
MCHC RBC AUTO-ENTMCNC: 32.8 G/DL (ref 32–36)
MCV RBC AUTO: 120 FL (ref 80–100)
MONOCYTES # BLD AUTO: 1.03 X10*3/UL (ref 0.05–0.8)
MONOCYTES NFR BLD AUTO: 6.6 %
NEUTROPHILS # BLD AUTO: 13.71 X10*3/UL (ref 1.6–5.5)
NEUTROPHILS NFR BLD AUTO: 88.2 %
NRBC BLD-RTO: ABNORMAL /100{WBCS}
PLATELET # BLD AUTO: 501 X10*3/UL (ref 150–450)
POLYCHROMASIA BLD QL SMEAR: NORMAL
POTASSIUM SERPL-SCNC: 4.3 MMOL/L (ref 3.5–5.3)
PROT SERPL-MCNC: 6.6 G/DL (ref 6.4–8.2)
RBC # BLD AUTO: 3.27 X10*6/UL (ref 4.5–5.9)
RBC MORPH BLD: NORMAL
SODIUM SERPL-SCNC: 139 MMOL/L (ref 136–145)
TEST COMMENT - SURGICAL SENDOUT REQUEST: NORMAL
TEST COMMENT - SURGICAL SENDOUT REQUEST: NORMAL
WBC # BLD AUTO: 15.6 X10*3/UL (ref 4.4–11.3)

## 2025-08-01 PROCEDURE — 1126F AMNT PAIN NOTED NONE PRSNT: CPT | Performed by: INTERNAL MEDICINE

## 2025-08-01 PROCEDURE — 36415 COLL VENOUS BLD VENIPUNCTURE: CPT

## 2025-08-01 PROCEDURE — 86704 HEP B CORE ANTIBODY TOTAL: CPT

## 2025-08-01 PROCEDURE — 1159F MED LIST DOCD IN RCRD: CPT | Performed by: INTERNAL MEDICINE

## 2025-08-01 PROCEDURE — 3078F DIAST BP <80 MM HG: CPT | Performed by: INTERNAL MEDICINE

## 2025-08-01 PROCEDURE — 1160F RVW MEDS BY RX/DR IN RCRD: CPT | Performed by: INTERNAL MEDICINE

## 2025-08-01 PROCEDURE — 85025 COMPLETE CBC W/AUTO DIFF WBC: CPT

## 2025-08-01 PROCEDURE — 81232 DPYD GENE COMMON VARIANTS: CPT

## 2025-08-01 PROCEDURE — 87340 HEPATITIS B SURFACE AG IA: CPT

## 2025-08-01 PROCEDURE — 80053 COMPREHEN METABOLIC PANEL: CPT

## 2025-08-01 PROCEDURE — G0452 MOLECULAR PATHOLOGY INTERPR: HCPCS | Performed by: STUDENT IN AN ORGANIZED HEALTH CARE EDUCATION/TRAINING PROGRAM

## 2025-08-01 PROCEDURE — 3075F SYST BP GE 130 - 139MM HG: CPT | Performed by: INTERNAL MEDICINE

## 2025-08-01 PROCEDURE — 85060 BLOOD SMEAR INTERPRETATION: CPT | Performed by: PATHOLOGY

## 2025-08-01 PROCEDURE — 99205 OFFICE O/P NEW HI 60 MIN: CPT | Performed by: INTERNAL MEDICINE

## 2025-08-01 PROCEDURE — 86706 HEP B SURFACE ANTIBODY: CPT

## 2025-08-01 PROCEDURE — G2211 COMPLEX E/M VISIT ADD ON: HCPCS | Performed by: INTERNAL MEDICINE

## 2025-08-01 PROCEDURE — 99215 OFFICE O/P EST HI 40 MIN: CPT | Mod: 25 | Performed by: INTERNAL MEDICINE

## 2025-08-01 RX ORDER — FAMOTIDINE 10 MG/ML
20 INJECTION, SOLUTION INTRAVENOUS ONCE AS NEEDED
Status: CANCELLED | OUTPATIENT
Start: 2025-08-25

## 2025-08-01 RX ORDER — ONDANSETRON HYDROCHLORIDE 2 MG/ML
8 INJECTION, SOLUTION INTRAVENOUS ONCE
Status: CANCELLED | OUTPATIENT
Start: 2025-08-11 | End: 2025-08-11

## 2025-08-01 RX ORDER — LORAZEPAM 2 MG/ML
1 INJECTION INTRAMUSCULAR AS NEEDED
Status: CANCELLED | OUTPATIENT
Start: 2025-08-25

## 2025-08-01 RX ORDER — FLUOROURACIL 50 MG/ML
400 INJECTION, SOLUTION INTRAVENOUS ONCE
Status: CANCELLED | OUTPATIENT
Start: 2025-08-11

## 2025-08-01 RX ORDER — FAMOTIDINE 10 MG/ML
20 INJECTION, SOLUTION INTRAVENOUS ONCE AS NEEDED
Status: CANCELLED | OUTPATIENT
Start: 2025-08-11

## 2025-08-01 RX ORDER — ONDANSETRON HYDROCHLORIDE 2 MG/ML
8 INJECTION, SOLUTION INTRAVENOUS ONCE
Status: CANCELLED | OUTPATIENT
Start: 2025-08-25 | End: 2025-08-25

## 2025-08-01 RX ORDER — PROCHLORPERAZINE EDISYLATE 5 MG/ML
10 INJECTION INTRAMUSCULAR; INTRAVENOUS EVERY 6 HOURS PRN
Status: CANCELLED | OUTPATIENT
Start: 2025-08-11

## 2025-08-01 RX ORDER — PALONOSETRON 0.05 MG/ML
0.25 INJECTION, SOLUTION INTRAVENOUS ONCE
Status: CANCELLED | OUTPATIENT
Start: 2025-08-27

## 2025-08-01 RX ORDER — FLUOROURACIL 50 MG/ML
400 INJECTION, SOLUTION INTRAVENOUS ONCE
Status: CANCELLED | OUTPATIENT
Start: 2025-08-25

## 2025-08-01 RX ORDER — EPINEPHRINE 0.3 MG/.3ML
0.3 INJECTION SUBCUTANEOUS EVERY 5 MIN PRN
Status: CANCELLED | OUTPATIENT
Start: 2025-08-11

## 2025-08-01 RX ORDER — PROCHLORPERAZINE MALEATE 10 MG
10 TABLET ORAL EVERY 6 HOURS PRN
Status: CANCELLED | OUTPATIENT
Start: 2025-08-11

## 2025-08-01 RX ORDER — DIPHENHYDRAMINE HYDROCHLORIDE 50 MG/ML
50 INJECTION, SOLUTION INTRAMUSCULAR; INTRAVENOUS AS NEEDED
Status: CANCELLED | OUTPATIENT
Start: 2025-08-25

## 2025-08-01 RX ORDER — EPINEPHRINE 0.3 MG/.3ML
0.3 INJECTION SUBCUTANEOUS EVERY 5 MIN PRN
Status: CANCELLED | OUTPATIENT
Start: 2025-08-25

## 2025-08-01 RX ORDER — ALBUTEROL SULFATE 0.83 MG/ML
3 SOLUTION RESPIRATORY (INHALATION) AS NEEDED
Status: CANCELLED | OUTPATIENT
Start: 2025-08-25

## 2025-08-01 RX ORDER — PROCHLORPERAZINE MALEATE 10 MG
10 TABLET ORAL EVERY 6 HOURS PRN
Status: CANCELLED | OUTPATIENT
Start: 2025-08-25

## 2025-08-01 RX ORDER — FAMOTIDINE 10 MG/ML
20 INJECTION, SOLUTION INTRAVENOUS ONCE
Status: CANCELLED | OUTPATIENT
Start: 2025-08-11 | End: 2025-08-11

## 2025-08-01 RX ORDER — PALONOSETRON 0.05 MG/ML
0.25 INJECTION, SOLUTION INTRAVENOUS ONCE
Status: CANCELLED | OUTPATIENT
Start: 2025-08-13

## 2025-08-01 RX ORDER — DIPHENHYDRAMINE HYDROCHLORIDE 50 MG/ML
50 INJECTION, SOLUTION INTRAMUSCULAR; INTRAVENOUS AS NEEDED
Status: CANCELLED | OUTPATIENT
Start: 2025-08-11

## 2025-08-01 RX ORDER — PROCHLORPERAZINE EDISYLATE 5 MG/ML
10 INJECTION INTRAMUSCULAR; INTRAVENOUS EVERY 6 HOURS PRN
Status: CANCELLED | OUTPATIENT
Start: 2025-08-25

## 2025-08-01 RX ORDER — LORAZEPAM 2 MG/ML
1 INJECTION INTRAMUSCULAR AS NEEDED
Status: CANCELLED | OUTPATIENT
Start: 2025-08-11

## 2025-08-01 RX ORDER — FAMOTIDINE 10 MG/ML
20 INJECTION, SOLUTION INTRAVENOUS ONCE
Status: CANCELLED | OUTPATIENT
Start: 2025-08-25 | End: 2025-08-25

## 2025-08-01 RX ORDER — ALBUTEROL SULFATE 0.83 MG/ML
3 SOLUTION RESPIRATORY (INHALATION) AS NEEDED
Status: CANCELLED | OUTPATIENT
Start: 2025-08-11

## 2025-08-01 RX ORDER — HEPARIN 100 UNIT/ML
500 SYRINGE INTRAVENOUS AS NEEDED
Status: CANCELLED | OUTPATIENT
Start: 2025-08-01

## 2025-08-01 RX ORDER — ONDANSETRON 8 MG/1
8 TABLET, ORALLY DISINTEGRATING ORAL EVERY 8 HOURS PRN
Qty: 30 TABLET | Refills: 11 | Status: SHIPPED | OUTPATIENT
Start: 2025-08-01

## 2025-08-01 RX ORDER — HEPARIN SODIUM,PORCINE/PF 10 UNIT/ML
50 SYRINGE (ML) INTRAVENOUS AS NEEDED
Status: CANCELLED | OUTPATIENT
Start: 2025-08-01

## 2025-08-01 ASSESSMENT — PAIN SCALES - GENERAL: PAINLEVEL_OUTOF10: 0-NO PAIN

## 2025-08-02 LAB
HBV CORE AB SER QL: NONREACTIVE
HBV SURFACE AB SER-ACNC: 12.8 MIU/ML
HBV SURFACE AG SERPL QL IA: NONREACTIVE
HOLD SPECIMEN: NORMAL

## 2025-08-04 ENCOUNTER — APPOINTMENT (OUTPATIENT)
Dept: RADIOLOGY | Facility: HOSPITAL | Age: 75
End: 2025-08-04
Payer: MEDICARE

## 2025-08-04 ENCOUNTER — HOSPITAL ENCOUNTER (OUTPATIENT)
Facility: HOSPITAL | Age: 75
LOS: 1 days | Discharge: HOME | End: 2025-08-06
Attending: STUDENT IN AN ORGANIZED HEALTH CARE EDUCATION/TRAINING PROGRAM | Admitting: STUDENT IN AN ORGANIZED HEALTH CARE EDUCATION/TRAINING PROGRAM
Payer: MEDICARE

## 2025-08-04 ENCOUNTER — ANESTHESIA (OUTPATIENT)
Dept: OPERATING ROOM | Facility: HOSPITAL | Age: 75
End: 2025-08-04
Payer: MEDICARE

## 2025-08-04 ENCOUNTER — ANESTHESIA EVENT (OUTPATIENT)
Dept: OPERATING ROOM | Facility: HOSPITAL | Age: 75
End: 2025-08-04
Payer: MEDICARE

## 2025-08-04 DIAGNOSIS — C15.9 METASTASIS FROM ESOPHAGEAL CANCER (MULTI): Primary | ICD-10-CM

## 2025-08-04 DIAGNOSIS — C79.9 METASTASIS FROM ESOPHAGEAL CANCER (MULTI): Primary | ICD-10-CM

## 2025-08-04 DIAGNOSIS — C15.5 MALIGNANT NEOPLASM OF LOWER THIRD OF ESOPHAGUS (MULTI): ICD-10-CM

## 2025-08-04 PROBLEM — I82.409 DVT (DEEP VENOUS THROMBOSIS) (MULTI): Status: ACTIVE | Noted: 2025-08-04

## 2025-08-04 LAB
ABO GROUP (TYPE) IN BLOOD: NORMAL
ABO GROUP (TYPE) IN BLOOD: NORMAL
ANTIBODY SCREEN: NORMAL
PATH REVIEW-CBC DIFFERENTIAL: NORMAL
RH FACTOR (ANTIGEN D): NORMAL
RH FACTOR (ANTIGEN D): NORMAL

## 2025-08-04 PROCEDURE — 2500000004 HC RX 250 GENERAL PHARMACY W/ HCPCS (ALT 636 FOR OP/ED): Performed by: STUDENT IN AN ORGANIZED HEALTH CARE EDUCATION/TRAINING PROGRAM

## 2025-08-04 PROCEDURE — 7100000002 HC RECOVERY ROOM TIME - EACH INCREMENTAL 1 MINUTE: Performed by: STUDENT IN AN ORGANIZED HEALTH CARE EDUCATION/TRAINING PROGRAM

## 2025-08-04 PROCEDURE — 2500000004 HC RX 250 GENERAL PHARMACY W/ HCPCS (ALT 636 FOR OP/ED): Performed by: NURSE ANESTHETIST, CERTIFIED REGISTERED

## 2025-08-04 PROCEDURE — 3600000003 HC OR TIME - INITIAL BASE CHARGE - PROCEDURE LEVEL THREE: Performed by: STUDENT IN AN ORGANIZED HEALTH CARE EDUCATION/TRAINING PROGRAM

## 2025-08-04 PROCEDURE — 2720000007 HC OR 272 NO HCPCS: Performed by: STUDENT IN AN ORGANIZED HEALTH CARE EDUCATION/TRAINING PROGRAM

## 2025-08-04 PROCEDURE — 36415 COLL VENOUS BLD VENIPUNCTURE: CPT | Performed by: STUDENT IN AN ORGANIZED HEALTH CARE EDUCATION/TRAINING PROGRAM

## 2025-08-04 PROCEDURE — 3700000001 HC GENERAL ANESTHESIA TIME - INITIAL BASE CHARGE: Performed by: STUDENT IN AN ORGANIZED HEALTH CARE EDUCATION/TRAINING PROGRAM

## 2025-08-04 PROCEDURE — 76937 US GUIDE VASCULAR ACCESS: CPT | Performed by: STUDENT IN AN ORGANIZED HEALTH CARE EDUCATION/TRAINING PROGRAM

## 2025-08-04 PROCEDURE — 7100000001 HC RECOVERY ROOM TIME - INITIAL BASE CHARGE: Performed by: STUDENT IN AN ORGANIZED HEALTH CARE EDUCATION/TRAINING PROGRAM

## 2025-08-04 PROCEDURE — 71045 X-RAY EXAM CHEST 1 VIEW: CPT

## 2025-08-04 PROCEDURE — 3600000008 HC OR TIME - EACH INCREMENTAL 1 MINUTE - PROCEDURE LEVEL THREE: Performed by: STUDENT IN AN ORGANIZED HEALTH CARE EDUCATION/TRAINING PROGRAM

## 2025-08-04 PROCEDURE — 43246 EGD PLACE GASTROSTOMY TUBE: CPT | Performed by: STUDENT IN AN ORGANIZED HEALTH CARE EDUCATION/TRAINING PROGRAM

## 2025-08-04 PROCEDURE — 96372 THER/PROPH/DIAG INJ SC/IM: CPT | Performed by: STUDENT IN AN ORGANIZED HEALTH CARE EDUCATION/TRAINING PROGRAM

## 2025-08-04 PROCEDURE — 36561 INSERT TUNNELED CV CATH: CPT | Performed by: STUDENT IN AN ORGANIZED HEALTH CARE EDUCATION/TRAINING PROGRAM

## 2025-08-04 PROCEDURE — 86850 RBC ANTIBODY SCREEN: CPT | Performed by: STUDENT IN AN ORGANIZED HEALTH CARE EDUCATION/TRAINING PROGRAM

## 2025-08-04 PROCEDURE — 2500000005 HC RX 250 GENERAL PHARMACY W/O HCPCS: Performed by: STUDENT IN AN ORGANIZED HEALTH CARE EDUCATION/TRAINING PROGRAM

## 2025-08-04 PROCEDURE — C1788 PORT, INDWELLING, IMP: HCPCS | Performed by: STUDENT IN AN ORGANIZED HEALTH CARE EDUCATION/TRAINING PROGRAM

## 2025-08-04 PROCEDURE — 3700000002 HC GENERAL ANESTHESIA TIME - EACH INCREMENTAL 1 MINUTE: Performed by: STUDENT IN AN ORGANIZED HEALTH CARE EDUCATION/TRAINING PROGRAM

## 2025-08-04 PROCEDURE — 7100000011 HC EXTENDED STAY RECOVERY HOURLY - NURSING UNIT

## 2025-08-04 PROCEDURE — 77001 FLUOROGUIDE FOR VEIN DEVICE: CPT | Performed by: STUDENT IN AN ORGANIZED HEALTH CARE EDUCATION/TRAINING PROGRAM

## 2025-08-04 PROCEDURE — 2780000003 HC OR 278 NO HCPCS: Performed by: STUDENT IN AN ORGANIZED HEALTH CARE EDUCATION/TRAINING PROGRAM

## 2025-08-04 DEVICE — POWERPORT SLIM IMPLANTABLE PORT WITH ATTACHABLE 6F CHRONOFLEX OPEN-ENDED SINGLE-LUMEN VENOUS CATHETER INTERMEDIATE KIT (WITHOUT SUTURE PLUGS)
Type: IMPLANTABLE DEVICE | Site: CHEST | Status: FUNCTIONAL
Brand: POWERPORT, CHRONOFLEX

## 2025-08-04 RX ORDER — OXYCODONE HYDROCHLORIDE 5 MG/1
10 TABLET ORAL EVERY 4 HOURS PRN
Refills: 0 | Status: CANCELLED | OUTPATIENT
Start: 2025-08-04

## 2025-08-04 RX ORDER — LABETALOL HYDROCHLORIDE 5 MG/ML
5 INJECTION, SOLUTION INTRAVENOUS ONCE AS NEEDED
Status: DISCONTINUED | OUTPATIENT
Start: 2025-08-04 | End: 2025-08-04 | Stop reason: HOSPADM

## 2025-08-04 RX ORDER — LIDOCAINE HYDROCHLORIDE AND EPINEPHRINE 10; 10 UG/ML; MG/ML
INJECTION, SOLUTION INFILTRATION; PERINEURAL AS NEEDED
Status: DISCONTINUED | OUTPATIENT
Start: 2025-08-04 | End: 2025-08-04 | Stop reason: HOSPADM

## 2025-08-04 RX ORDER — HYDRALAZINE HYDROCHLORIDE 20 MG/ML
5 INJECTION INTRAMUSCULAR; INTRAVENOUS EVERY 30 MIN PRN
Status: DISCONTINUED | OUTPATIENT
Start: 2025-08-04 | End: 2025-08-04 | Stop reason: HOSPADM

## 2025-08-04 RX ORDER — MEPERIDINE HYDROCHLORIDE 25 MG/ML
12.5 INJECTION INTRAMUSCULAR; INTRAVENOUS; SUBCUTANEOUS EVERY 10 MIN PRN
Status: DISCONTINUED | OUTPATIENT
Start: 2025-08-04 | End: 2025-08-04 | Stop reason: HOSPADM

## 2025-08-04 RX ORDER — HEPARIN SODIUM 5000 [USP'U]/ML
5000 INJECTION, SOLUTION INTRAVENOUS; SUBCUTANEOUS ONCE
Status: COMPLETED | OUTPATIENT
Start: 2025-08-04 | End: 2025-08-04

## 2025-08-04 RX ORDER — SODIUM CHLORIDE 0.9 G/100ML
INJECTION, SOLUTION IRRIGATION AS NEEDED
Status: DISCONTINUED | OUTPATIENT
Start: 2025-08-04 | End: 2025-08-04 | Stop reason: HOSPADM

## 2025-08-04 RX ORDER — SODIUM CHLORIDE, SODIUM LACTATE, POTASSIUM CHLORIDE, CALCIUM CHLORIDE 600; 310; 30; 20 MG/100ML; MG/100ML; MG/100ML; MG/100ML
100 INJECTION, SOLUTION INTRAVENOUS CONTINUOUS
Status: DISCONTINUED | OUTPATIENT
Start: 2025-08-04 | End: 2025-08-04 | Stop reason: HOSPADM

## 2025-08-04 RX ORDER — OXYCODONE HYDROCHLORIDE 5 MG/1
5 TABLET ORAL EVERY 4 HOURS PRN
Refills: 0 | Status: CANCELLED | OUTPATIENT
Start: 2025-08-04

## 2025-08-04 RX ORDER — ALBUTEROL SULFATE 0.83 MG/ML
2.5 SOLUTION RESPIRATORY (INHALATION) ONCE AS NEEDED
Status: DISCONTINUED | OUTPATIENT
Start: 2025-08-04 | End: 2025-08-04 | Stop reason: HOSPADM

## 2025-08-04 RX ORDER — PANTOPRAZOLE SODIUM 40 MG/1
40 TABLET, DELAYED RELEASE ORAL DAILY
Status: DISCONTINUED | OUTPATIENT
Start: 2025-08-04 | End: 2025-08-06 | Stop reason: HOSPADM

## 2025-08-04 RX ORDER — DIPHENHYDRAMINE HYDROCHLORIDE 50 MG/ML
12.5 INJECTION, SOLUTION INTRAMUSCULAR; INTRAVENOUS ONCE AS NEEDED
Status: DISCONTINUED | OUTPATIENT
Start: 2025-08-04 | End: 2025-08-04 | Stop reason: HOSPADM

## 2025-08-04 RX ORDER — ROCURONIUM BROMIDE 10 MG/ML
INJECTION, SOLUTION INTRAVENOUS AS NEEDED
Status: DISCONTINUED | OUTPATIENT
Start: 2025-08-04 | End: 2025-08-04

## 2025-08-04 RX ORDER — MIDAZOLAM HYDROCHLORIDE 1 MG/ML
INJECTION, SOLUTION INTRAMUSCULAR; INTRAVENOUS AS NEEDED
Status: DISCONTINUED | OUTPATIENT
Start: 2025-08-04 | End: 2025-08-04

## 2025-08-04 RX ORDER — ACETAMINOPHEN 160 MG/5ML
650 SUSPENSION ORAL EVERY 4 HOURS PRN
Status: DISCONTINUED | OUTPATIENT
Start: 2025-08-04 | End: 2025-08-06 | Stop reason: HOSPADM

## 2025-08-04 RX ORDER — FENTANYL CITRATE 50 UG/ML
25 INJECTION, SOLUTION INTRAMUSCULAR; INTRAVENOUS EVERY 5 MIN PRN
Status: DISCONTINUED | OUTPATIENT
Start: 2025-08-04 | End: 2025-08-04 | Stop reason: HOSPADM

## 2025-08-04 RX ORDER — ACETAMINOPHEN 325 MG/1
650 TABLET ORAL EVERY 4 HOURS PRN
Status: DISCONTINUED | OUTPATIENT
Start: 2025-08-04 | End: 2025-08-06 | Stop reason: HOSPADM

## 2025-08-04 RX ORDER — MELOXICAM 7.5 MG/1
15 TABLET ORAL
Status: DISCONTINUED | OUTPATIENT
Start: 2025-08-05 | End: 2025-08-06 | Stop reason: HOSPADM

## 2025-08-04 RX ORDER — SODIUM CHLORIDE, SODIUM LACTATE, POTASSIUM CHLORIDE, CALCIUM CHLORIDE 600; 310; 30; 20 MG/100ML; MG/100ML; MG/100ML; MG/100ML
50 INJECTION, SOLUTION INTRAVENOUS CONTINUOUS
Status: ACTIVE | OUTPATIENT
Start: 2025-08-04 | End: 2025-08-05

## 2025-08-04 RX ORDER — LIDOCAINE HYDROCHLORIDE 20 MG/ML
INJECTION, SOLUTION INFILTRATION; PERINEURAL AS NEEDED
Status: DISCONTINUED | OUTPATIENT
Start: 2025-08-04 | End: 2025-08-04

## 2025-08-04 RX ORDER — ONDANSETRON HYDROCHLORIDE 2 MG/ML
INJECTION, SOLUTION INTRAVENOUS AS NEEDED
Status: DISCONTINUED | OUTPATIENT
Start: 2025-08-04 | End: 2025-08-04

## 2025-08-04 RX ORDER — CEFAZOLIN SODIUM 2 G/50ML
2 SOLUTION INTRAVENOUS ONCE
Status: COMPLETED | OUTPATIENT
Start: 2025-08-04 | End: 2025-08-04

## 2025-08-04 RX ORDER — ACETAMINOPHEN 650 MG/1
650 SUPPOSITORY RECTAL EVERY 4 HOURS PRN
Status: DISCONTINUED | OUTPATIENT
Start: 2025-08-04 | End: 2025-08-06 | Stop reason: HOSPADM

## 2025-08-04 RX ORDER — FOLIC ACID 1 MG/1
1 TABLET ORAL DAILY
COMMUNITY

## 2025-08-04 RX ORDER — FENTANYL CITRATE 50 UG/ML
INJECTION, SOLUTION INTRAMUSCULAR; INTRAVENOUS AS NEEDED
Status: DISCONTINUED | OUTPATIENT
Start: 2025-08-04 | End: 2025-08-04

## 2025-08-04 RX ORDER — ATORVASTATIN CALCIUM 20 MG/1
20 TABLET, FILM COATED ORAL
Status: DISCONTINUED | OUTPATIENT
Start: 2025-08-04 | End: 2025-08-06 | Stop reason: HOSPADM

## 2025-08-04 RX ORDER — BUPIVACAINE HYDROCHLORIDE 5 MG/ML
INJECTION, SOLUTION EPIDURAL; INTRACAUDAL; PERINEURAL AS NEEDED
Status: DISCONTINUED | OUTPATIENT
Start: 2025-08-04 | End: 2025-08-04 | Stop reason: HOSPADM

## 2025-08-04 RX ORDER — ONDANSETRON 4 MG/1
8 TABLET, ORALLY DISINTEGRATING ORAL EVERY 8 HOURS PRN
Status: DISCONTINUED | OUTPATIENT
Start: 2025-08-04 | End: 2025-08-06 | Stop reason: HOSPADM

## 2025-08-04 RX ORDER — ONDANSETRON HYDROCHLORIDE 2 MG/ML
4 INJECTION, SOLUTION INTRAVENOUS ONCE AS NEEDED
Status: DISCONTINUED | OUTPATIENT
Start: 2025-08-04 | End: 2025-08-04 | Stop reason: HOSPADM

## 2025-08-04 RX ORDER — HYDROXYUREA 500 MG/1
500 CAPSULE ORAL 2 TIMES DAILY
Status: DISCONTINUED | OUTPATIENT
Start: 2025-08-04 | End: 2025-08-06 | Stop reason: HOSPADM

## 2025-08-04 RX ORDER — PROPOFOL 10 MG/ML
INJECTION, EMULSION INTRAVENOUS AS NEEDED
Status: DISCONTINUED | OUTPATIENT
Start: 2025-08-04 | End: 2025-08-04

## 2025-08-04 RX ORDER — HEPARIN SODIUM 5000 [USP'U]/ML
5000 INJECTION, SOLUTION INTRAVENOUS; SUBCUTANEOUS EVERY 8 HOURS
Status: DISCONTINUED | OUTPATIENT
Start: 2025-08-05 | End: 2025-08-06

## 2025-08-04 RX ORDER — LISINOPRIL 20 MG/1
20 TABLET ORAL DAILY
Status: DISCONTINUED | OUTPATIENT
Start: 2025-08-04 | End: 2025-08-05

## 2025-08-04 RX ADMIN — SUGAMMADEX 200 MG: 100 INJECTION, SOLUTION INTRAVENOUS at 15:56

## 2025-08-04 RX ADMIN — FENTANYL CITRATE 50 MCG: 50 INJECTION, SOLUTION INTRAMUSCULAR; INTRAVENOUS at 15:42

## 2025-08-04 RX ADMIN — ROCURONIUM BROMIDE 50 MG: 10 SOLUTION INTRAVENOUS at 14:56

## 2025-08-04 RX ADMIN — ONDANSETRON 4 MG: 2 INJECTION, SOLUTION INTRAMUSCULAR; INTRAVENOUS at 14:56

## 2025-08-04 RX ADMIN — MIDAZOLAM 2 MG: 1 INJECTION INTRAMUSCULAR; INTRAVENOUS at 14:49

## 2025-08-04 RX ADMIN — HEPARIN SODIUM 5000 UNITS: 5000 INJECTION, SOLUTION INTRAVENOUS; SUBCUTANEOUS at 10:47

## 2025-08-04 RX ADMIN — CEFAZOLIN SODIUM 2 G: 2 SOLUTION INTRAVENOUS at 15:03

## 2025-08-04 RX ADMIN — LIDOCAINE HYDROCHLORIDE 100 MG: 20 INJECTION, SOLUTION INFILTRATION; PERINEURAL at 14:56

## 2025-08-04 RX ADMIN — PROPOFOL 150 MG: 10 INJECTION, EMULSION INTRAVENOUS at 14:56

## 2025-08-04 RX ADMIN — FENTANYL CITRATE 50 MCG: 50 INJECTION, SOLUTION INTRAMUSCULAR; INTRAVENOUS at 14:56

## 2025-08-04 RX ADMIN — DEXAMETHASONE SODIUM PHOSPHATE 4 MG: 4 INJECTION, SOLUTION INTRAMUSCULAR; INTRAVENOUS at 15:08

## 2025-08-04 RX ADMIN — PROPOFOL 50 MG: 10 INJECTION, EMULSION INTRAVENOUS at 15:46

## 2025-08-04 RX ADMIN — SODIUM CHLORIDE, SODIUM LACTATE, POTASSIUM CHLORIDE, AND CALCIUM CHLORIDE 50 ML/HR: .6; .31; .03; .02 INJECTION, SOLUTION INTRAVENOUS at 10:47

## 2025-08-04 SDOH — SOCIAL STABILITY: SOCIAL INSECURITY: HAVE YOU HAD ANY THOUGHTS OF HARMING ANYONE ELSE?: NO

## 2025-08-04 SDOH — SOCIAL STABILITY: SOCIAL INSECURITY: DO YOU FEEL UNSAFE GOING BACK TO THE PLACE WHERE YOU ARE LIVING?: NO

## 2025-08-04 SDOH — SOCIAL STABILITY: SOCIAL INSECURITY
WITHIN THE LAST YEAR, HAVE YOU BEEN RAPED OR FORCED TO HAVE ANY KIND OF SEXUAL ACTIVITY BY YOUR PARTNER OR EX-PARTNER?: NO

## 2025-08-04 SDOH — SOCIAL STABILITY: SOCIAL INSECURITY: WITHIN THE LAST YEAR, HAVE YOU BEEN HUMILIATED OR EMOTIONALLY ABUSED IN OTHER WAYS BY YOUR PARTNER OR EX-PARTNER?: NO

## 2025-08-04 SDOH — HEALTH STABILITY: MENTAL HEALTH: CURRENT SMOKER: 0

## 2025-08-04 SDOH — ECONOMIC STABILITY: HOUSING INSECURITY: IN THE LAST 12 MONTHS, WAS THERE A TIME WHEN YOU WERE NOT ABLE TO PAY THE MORTGAGE OR RENT ON TIME?: NO

## 2025-08-04 SDOH — ECONOMIC STABILITY: FOOD INSECURITY: WITHIN THE PAST 12 MONTHS, THE FOOD YOU BOUGHT JUST DIDN'T LAST AND YOU DIDN'T HAVE MONEY TO GET MORE.: NEVER TRUE

## 2025-08-04 SDOH — ECONOMIC STABILITY: INCOME INSECURITY: IN THE PAST 12 MONTHS HAS THE ELECTRIC, GAS, OIL, OR WATER COMPANY THREATENED TO SHUT OFF SERVICES IN YOUR HOME?: NO

## 2025-08-04 SDOH — SOCIAL STABILITY: SOCIAL INSECURITY: DOES ANYONE TRY TO KEEP YOU FROM HAVING/CONTACTING OTHER FRIENDS OR DOING THINGS OUTSIDE YOUR HOME?: NO

## 2025-08-04 SDOH — SOCIAL STABILITY: SOCIAL INSECURITY: WITHIN THE LAST YEAR, HAVE YOU BEEN AFRAID OF YOUR PARTNER OR EX-PARTNER?: NO

## 2025-08-04 SDOH — SOCIAL STABILITY: SOCIAL INSECURITY: WERE YOU ABLE TO COMPLETE ALL THE BEHAVIORAL HEALTH SCREENINGS?: YES

## 2025-08-04 SDOH — ECONOMIC STABILITY: FOOD INSECURITY: HOW HARD IS IT FOR YOU TO PAY FOR THE VERY BASICS LIKE FOOD, HOUSING, MEDICAL CARE, AND HEATING?: NOT HARD AT ALL

## 2025-08-04 SDOH — ECONOMIC STABILITY: HOUSING INSECURITY: IN THE PAST 12 MONTHS, HOW MANY TIMES HAVE YOU MOVED WHERE YOU WERE LIVING?: 0

## 2025-08-04 SDOH — ECONOMIC STABILITY: TRANSPORTATION INSECURITY: IN THE PAST 12 MONTHS, HAS LACK OF TRANSPORTATION KEPT YOU FROM MEDICAL APPOINTMENTS OR FROM GETTING MEDICATIONS?: NO

## 2025-08-04 SDOH — ECONOMIC STABILITY: HOUSING INSECURITY: DO YOU FEEL UNSAFE GOING BACK TO THE PLACE WHERE YOU LIVE?: NO

## 2025-08-04 SDOH — SOCIAL STABILITY: SOCIAL INSECURITY
WITHIN THE LAST YEAR, HAVE YOU BEEN KICKED, HIT, SLAPPED, OR OTHERWISE PHYSICALLY HURT BY YOUR PARTNER OR EX-PARTNER?: NO

## 2025-08-04 SDOH — SOCIAL STABILITY: SOCIAL INSECURITY: DO YOU FEEL ANYONE HAS EXPLOITED OR TAKEN ADVANTAGE OF YOU FINANCIALLY OR OF YOUR PERSONAL PROPERTY?: NO

## 2025-08-04 SDOH — SOCIAL STABILITY: SOCIAL INSECURITY: ABUSE: ADULT

## 2025-08-04 SDOH — ECONOMIC STABILITY: HOUSING INSECURITY: AT ANY TIME IN THE PAST 12 MONTHS, WERE YOU HOMELESS OR LIVING IN A SHELTER (INCLUDING NOW)?: NO

## 2025-08-04 SDOH — ECONOMIC STABILITY: FOOD INSECURITY: WITHIN THE PAST 12 MONTHS, YOU WORRIED THAT YOUR FOOD WOULD RUN OUT BEFORE YOU GOT THE MONEY TO BUY MORE.: NEVER TRUE

## 2025-08-04 SDOH — SOCIAL STABILITY: SOCIAL INSECURITY: HAS ANYONE EVER THREATENED TO HURT YOUR FAMILY OR YOUR PETS?: NO

## 2025-08-04 SDOH — SOCIAL STABILITY: SOCIAL INSECURITY: ARE THERE ANY APPARENT SIGNS OF INJURIES/BEHAVIORS THAT COULD BE RELATED TO ABUSE/NEGLECT?: NO

## 2025-08-04 SDOH — SOCIAL STABILITY: SOCIAL INSECURITY: ARE YOU OR HAVE YOU BEEN THREATENED OR ABUSED PHYSICALLY, EMOTIONALLY, OR SEXUALLY BY ANYONE?: NO

## 2025-08-04 SDOH — SOCIAL STABILITY: SOCIAL INSECURITY: HAVE YOU HAD THOUGHTS OF HARMING ANYONE ELSE?: NO

## 2025-08-04 ASSESSMENT — ACTIVITIES OF DAILY LIVING (ADL)
PATIENT'S MEMORY ADEQUATE TO SAFELY COMPLETE DAILY ACTIVITIES?: YES
GROOMING: INDEPENDENT
ADEQUATE_TO_COMPLETE_ADL: YES
TOILETING: INDEPENDENT
FEEDING YOURSELF: INDEPENDENT
LACK_OF_TRANSPORTATION: NO
BATHING: INDEPENDENT
ASSISTIVE_DEVICE: EYEGLASSES
HEARING - LEFT EAR: FUNCTIONAL
HEARING - RIGHT EAR: FUNCTIONAL
DRESSING YOURSELF: INDEPENDENT
JUDGMENT_ADEQUATE_SAFELY_COMPLETE_DAILY_ACTIVITIES: YES
LACK_OF_TRANSPORTATION: NO
WALKS IN HOME: INDEPENDENT

## 2025-08-04 ASSESSMENT — PAIN - FUNCTIONAL ASSESSMENT
PAIN_FUNCTIONAL_ASSESSMENT: 0-10

## 2025-08-04 ASSESSMENT — COGNITIVE AND FUNCTIONAL STATUS - GENERAL
CLIMB 3 TO 5 STEPS WITH RAILING: A LITTLE
WALKING IN HOSPITAL ROOM: A LITTLE
PATIENT BASELINE BEDBOUND: NO
DAILY ACTIVITIY SCORE: 24
MOBILITY SCORE: 22

## 2025-08-04 ASSESSMENT — LIFESTYLE VARIABLES
HOW MANY STANDARD DRINKS CONTAINING ALCOHOL DO YOU HAVE ON A TYPICAL DAY: PATIENT DOES NOT DRINK
SKIP TO QUESTIONS 9-10: 1
AUDIT-C TOTAL SCORE: 0
HOW OFTEN DO YOU HAVE A DRINK CONTAINING ALCOHOL: NEVER
AUDIT-C TOTAL SCORE: 0
HOW OFTEN DO YOU HAVE 6 OR MORE DRINKS ON ONE OCCASION: NEVER

## 2025-08-04 ASSESSMENT — COLUMBIA-SUICIDE SEVERITY RATING SCALE - C-SSRS
2. HAVE YOU ACTUALLY HAD ANY THOUGHTS OF KILLING YOURSELF?: NO
1. IN THE PAST MONTH, HAVE YOU WISHED YOU WERE DEAD OR WISHED YOU COULD GO TO SLEEP AND NOT WAKE UP?: NO
6. HAVE YOU EVER DONE ANYTHING, STARTED TO DO ANYTHING, OR PREPARED TO DO ANYTHING TO END YOUR LIFE?: NO

## 2025-08-04 ASSESSMENT — PATIENT HEALTH QUESTIONNAIRE - PHQ9
2. FEELING DOWN, DEPRESSED OR HOPELESS: NOT AT ALL
SUM OF ALL RESPONSES TO PHQ9 QUESTIONS 1 & 2: 0
1. LITTLE INTEREST OR PLEASURE IN DOING THINGS: NOT AT ALL

## 2025-08-04 ASSESSMENT — PAIN SCALES - GENERAL
PAINLEVEL_OUTOF10: 0 - NO PAIN

## 2025-08-04 NOTE — INTERVAL H&P NOTE
H&P reviewed. The patient was examined and there are no changes to the H&P.    Ellen Jones MD  Thoracic Surgeon  [facility]   of Medicine  [facility]  Office phone: [phone]  Fax: [phone]

## 2025-08-04 NOTE — OP NOTE
EGD, PEG - pediatric gastroscope, possible open gastrostomy Operative Note     Date: 2025  OR Location: [facility]    Name: Ilan Rosales : 1950, Age: 75 y.o., MRN: [id number], Sex: male    Diagnosis  Pre-op Diagnosis      * Metastasis from esophageal cancer (Multi) [C79.9, C15.9] Post-op Diagnosis     * Metastasis from esophageal cancer (Multi) [C79.9, C15.9]     Procedures  Placement of Port-A-Cath with intraoperative fluoroscopy and ultrasound guidance    Surgeons   Panel 1:     * Ellen Jones - Primary  Panel 2:     * Duy Davis - Primary    Resident/Fellow/Other Assistant:  Surgeons and Role:  Panel 1:     * Bonny Johnston PA-C - Assisting    Staff:   Circulator: Melquiades Chahal Person: Lydia  Circulator: Marta Gottlieb Scrub: Shana    Anesthesia Staff: Anesthesiologist: Kentrell Sharma DO; Todd Kennedy DO  CRNA: CHRISTIANO Mejia-CRNA    Procedure Summary  Anesthesia: General  ASA: III  Estimated Blood Loss: 10mL  Intra-op Medications: Administrations occurring from 1145 to 1300 on 25:  * No intraprocedure medications in log *           Anesthesia Record               Intraprocedure I/O Totals       None           Specimen: No specimens collected               Details of procedure: The patient arrived to the preoperative area at [facility] for the aforementioned procedure. History and physical was performed, consent was verified, questions were answered, and he was moved into the operating room. A timeout was performed and he was induced under general anesthesia. The chest was prepped and draped in the usual sterile fashion.  An ultrasound was used to identify his right internal jugular vein.  Entry was obtained with an access needle and a wire was placed into the IJ and SVC with position confirmed with fluoroscopy.  A stab incision was made at this entry point.  A 3 cm incision was made on the right upper chest and a subcutaneous pocket was created for the  port.  A 6F power port was used. The port and catheter were connected and flushed with saline.  The catheter was tunneled antegrade from the pocket to the access incision.  The port was secured in the pocket with two 3-0 Prolene sutures.  Under fluoroscopic guidance that she had and dilator were inserted into the IJ and SVC over the wire.  The wire and dilator were removed.  The catheter was measured and cut externally with the tip 2 vertebral bodies below the zoey.  This was then inserted through the sheath and the length was adjusted so that the tip was just below the zoey.  The sheath was then cracked, catheter advanced and stabilized, and sheath was removed.  Final position of the catheter was confirmed with fluoroscopy.  The catheter had a gentle curve at the top and the tip was 2 vertebral bodies below the zoey.  The port aspirated and flushed with saline easily and was then instilled with 2 mL of dilute heparinized saline.  The port incision was closed with deep dermal 3-0 Vicryl's and a running subcuticular 4-0 Monocryl.  Both incisions were dressed with Dermabond.      All sponge and needle counts were correct at the end of the case. The patient was awoken from the procedure and moved to the PACU for recovery. I was present and scrubbed and directed all operative decision making.    Please note that we will be billing for the COLLIN's first assist as he/she was critical for successful completion of the case including positioning of the body, retraction, suture management,  and wound closure. There was no qualified resident available for the case.    Duy Davis MD  Assistant Professor of Surgery  Division of Surgical Oncology  [phone]  [email or web address]

## 2025-08-04 NOTE — OP NOTE
Date: 2025  OR Location: [facility]    Name: Ilan Rosales : 1950, Age: 75 y.o., MRN: [id number], Sex: male    Preop Diagnosis: metastatic esophageal cancer  Postop Diagnosis: metastatic esophageal cancer    Procedures:  EGD PEG - Dr Robert Khan placement - Dr Davis    Surgeons:  Ellen Jones MD    Resident/Fellow/Other Assistant:  Surgeons and Role:  Panel 1:     * Bonny Johnston PA-C - Assisting    Anesthesia: General  ASA: III  Anesthesia Staff: Anesthesiologist: Kentrell Sharma DO; Todd Kennedy DO  CRNA: CHRISTIANO Mejia-CRNA  Estimated Blood Loss: 5mL  Intra-op Medications: Administrations occurring from 1145 to 1300 on 25:  * No intraprocedure medications in log *         Anesthesia Record               Intraprocedure I/O Totals       None           Specimen: No specimens collected     Staff:   Circulator: Marta Hernandez RN; Melquiades Nascimento RN  Relief Scrub: Shana Osborn  Scrub Person: Lydia Washburn    Findings: Obstructed distal esophageal cancer with proximal esophageal dilation.  Traversed with pediatric electrosurgical.  PEG placement.    Indication:  This 75-year-old gentleman with past med history of longstanding GERD who presented with dysphagia and weight loss.  He was found to have distal esophageal cancer.  Full staging workup includes PET/CT showed metastatic disease in the liver and bone.  Due to severe dysphagia, I recommended feeding tube placement.  The alternative is Dobbhoff placement.  The risk of surgery include bleeding, infection, perforation, and postop pain.  The patient consented to proceed with surgery.    The patient was seen in the preoperative area. The risks, benefits, complications, treatment options, non-operative alternatives, expected recovery and outcomes were discussed with the patient. The possibilities of reaction to medication, pulmonary aspiration, injury to surrounding structures, bleeding, recurrent infection, the need for  additional procedures, failure to diagnose a condition, and creating a complication requiring transfusion or operation were discussed with the patient. The patient concurred with the proposed plan, giving informed consent.  The site of surgery was properly noted/marked if necessary per policy. The patient has been actively warmed in preoperative area. Preoperative antibiotics have been ordered and given within 1 hours of incision. Venous thrombosis prophylaxis have been ordered including bilateral sequential compression devices and chemical prophylaxis.     Operation Details:  Patient was brought to operation room. A time-out was performed. Patient name, MRN and procedure were confirmed and all staff were in agreement. SCDs were placed and working. Ancef was given for this procedure. Patient was induced and intubated with a single lumen tube without issue. A pre-incision time out was performed. All staff were in agreement to proceed.  The case was started with mediport placement.  Please refer to Dr Davis op report for details.    After Mediport placement,  I used a pediatric gastroscope to intubate the esophagus.  There was pooling of saliva in the back of mouth and proximal esophagus.  This was suctioned out.  I encountered the esophageal mass at 37 cm from incisor.  It was friable mass with complete obstruction.  With careful maneuver, I was able to traverse the mass using a pediatric scope into the stomach.  The stomach was healthy. The pylorus was widely patent. The D1 and D2 were reached with clean bile and healthy mucosa. I withdrew the scope back to the stomach. I then used transillumination and finger invagination to identify a spot in the upper abdomen towards the atrium of the stomach.  The abdomen was prepped and draped in sterile fashion.  A needle was used to gain access into the stomach under visual guidance.  Then a wire was passed through the needle into stomach which was snared through the scope.   The wire was then delivered to the mouth and connected to the PEG tube.  The PEG tube was then pulled into the stomach followed by the gastroscope.  The PEG was sitting comfortably in the atrium and spinning freely.  The PEG bumper was at 3cm at skin.  I ensured hemostasis and desufflated the stomach. Antibiotic ointment was used on the skin level.  All count was correct.  Patient was then waken from anesthesia and brought to recovery room in stable condition.    I was present for the entire duration of the procedure.    Bonny Johnston is required at bedside as first assist for the case including instrumentation, troubleshooting, dissection, and specimen retrieval. Since there is no qualified resident to assist at bedside, her role is critical to ensure the safety of the complex operation.     Ellen Jones MD  Thoracic Surgeon  [facility]   of Medicine  [facility]  Office phone: [phone]  Fax: [phone]  Pager: [phone]

## 2025-08-04 NOTE — ANESTHESIA PREPROCEDURE EVALUATION
Ilan Rosales is a 75 y.o. male here for:    EGD, PEG - pediatric gastroscope, possible open gastrostomy  With Ellen Jones MD  Pre-Op Diagnosis Codes:      * Metastasis from esophageal cancer [C79.9, C15.9]    Relevant Problems   Cardiac   (+) Essential hypertension   (+) Hyperlipidemia      GI   (+) Malignant neoplasm of lower third of esophagus (Multi)   (+) Metastasis from esophageal cancer (Multi)      Liver   (+) Malignant neoplasm of lower third of esophagus (Multi)   (+) Metastasis from esophageal cancer (Multi)      Hematology   (+) DVT (deep venous thrombosis) (Multi)      Musculoskeletal   (+) Osteoarthritis      ID   (+) Onychomycosis      Skin   (+) Eczema       Lab Results   Component Value Date    HGB 12.9 (L) 08/01/2025    HGB 12.8 (L) 06/17/2025    HCT 39.3 (L) 08/01/2025    HCT 36.3 (L) 06/17/2025    WBC 15.6 (H) 08/01/2025    WBC 5.1 06/17/2025     (H) 08/01/2025     06/17/2025     08/01/2025     06/17/2025    K 4.3 08/01/2025    K 4.1 06/17/2025     08/01/2025     06/17/2025    CREATININE 1.64 (H) 08/01/2025    CREATININE 1.39 (H) 06/17/2025    BUN 17 08/01/2025    BUN 20 06/17/2025       Tobacco Use History[1]    RX Allergies[2]    Current Outpatient Medications   Medication Instructions   • apixaban (Eliquis) 5 mg tablet 2 times daily   • atorvastatin (LIPITOR) 20 mg, Daily RT   • hydroxyurea (Hydrea) 500 mg capsule 1 capsule, 2 times daily   • lactose-reduced food (ENSURE COMPLETE ORAL) 1 Can, Daily   • lisinopril 20 mg, Daily   • meloxicam (MOBIC) 15 mg, oral, Daily with breakfast   • multivitamin tablet 1 tablet, Daily   • ondansetron ODT (ZOFRAN-ODT) 8 mg, oral, Every 8 hours PRN   • pantoprazole (PROTONIX) 40 mg, oral, Daily   • vitamins A,C,E-zinc-copper (PreserVision AREDS) 4,296 mcg-226 mg-90 mg capsule 1 capsule, 2 times daily       Surgical History[3]    Family History[4]    NPO Details:  NPO/Void Status  Carbohydrate Drink Given Prior to  "Surgery? : N  Date of Last Liquid: 08/03/25 (protein shakes)  Time of Last Liquid: 2030  Date of Last Solid: 07/07/25  Time of Last Solid: 0800  Last Intake Type: Clear fluids  Time of Last Void: 0830        Physical Exam  Airway  Mallampati: II  TM distance: >3 FB  Neck ROM: full  Mouth opening: >= 4 cm    Cardiovascular - normal exam  Dental - normal exam  Pulmonary - normal exam  Neurological   Abdominal         Anesthesia Plan    History of general anesthesia?: yes  History of complications of general anesthesia?: no    ASA 3     general     The patient is not a current smoker.    intravenous induction   Postoperative pain plan includes opioids.  Trial extubation is planned.  Anesthetic plan and risks discussed with patient.    Plan discussed with CRNA.               [1]  Social History  Tobacco Use   Smoking Status Never   Smokeless Tobacco Never   [2]  Allergies  Allergen Reactions   • Bee Venom Protein (Honey Bee) Anaphylaxis and Shortness of breath     Throat swells   [3]  Past Surgical History:  Procedure Laterality Date   • COLONOSCOPY     • EXTERNAL EAR SURGERY      \"pinning of ears\"   • JOINT REPLACEMENT     • KNEE ARTHROPLASTY Bilateral    • TONSILLECTOMY     • UPPER GASTROINTESTINAL ENDOSCOPY     [4]  Family History  Problem Relation Name Age of Onset   • Dementia Mother     • Heart attack Father     "

## 2025-08-04 NOTE — PROGRESS NOTES
Pt, spouse Erika, and daughter present for education. I provided education on Folfox (5FU, oxaliplatin,and leucovorin) treatment scheduled, possible side effects.  Pt was provided written education on possible side effects including PI sheets on neuropathy, cold sensitivity, nausea, diarrhea and fatigue, as well as education on the chemotherapy plan and medications, when to call/how to reach the office, care team, fever threshold. I highlighted the area on safe handling of body fluids in the handbook and we discussed safe practices during intimacy. We discussed need for good sun protection, reporting side effects. He is scheduled for port and PEG placement on Monday 8/4. He and spouse verbalized early understanding of plan. He has elected to get his labs done in infusion the Friday prior to treatments. He will call if he has any questions and was offered the opportunity for follow up leading up to cycle 1. He will need ongoing support and reinforcement, elie completed and mailed to him home morning of 8/4. Referral to nutrition and Supportive Oncology placed.

## 2025-08-04 NOTE — ANESTHESIA PROCEDURE NOTES
Airway  Date/Time: 8/4/2025 3:04 PM  Reason: elective    Airway not difficult    Staffing  Performed: CRNA   Authorized by: Kentrell Sharma DO    Performed by: CHRISTIANO Mejia-CRNA  Patient location during procedure: OR    Patient Condition  Indications for airway management: anesthesia  Patient position: sniffing  MILS maintained throughout  Planned trial extubation  Sedation level: deep     Final Airway Details   Preoxygenated: yes  Final airway type: endotracheal airway  Successful airway: ETT   Successful intubation technique: video laryngoscopy  Adjuncts used in placement: cricoid pressure and intubating stylet  Endotracheal tube insertion site: oral  Blade size: #4 (mcgraff)  ETT size (mm): 7.5  Cormack-Lehane Classification: grade I - full view of glottis  Placement verified by: capnometry   Measured from: lips  ETT to lips (cm): 24  Ventilation between attempts: none  Number of attempts at approach: 1

## 2025-08-05 ENCOUNTER — HOME HEALTH ADMISSION (OUTPATIENT)
Dept: HOME HEALTH SERVICES | Facility: HOME HEALTH | Age: 75
End: 2025-08-05
Payer: MEDICARE

## 2025-08-05 LAB
ALBUMIN SERPL BCP-MCNC: 3.4 G/DL (ref 3.4–5)
ALP SERPL-CCNC: 118 U/L (ref 33–136)
ALT SERPL W P-5'-P-CCNC: 46 U/L (ref 10–52)
ANION GAP SERPL CALC-SCNC: 13 MMOL/L (ref 10–20)
AST SERPL W P-5'-P-CCNC: 53 U/L (ref 9–39)
BILIRUB SERPL-MCNC: 0.7 MG/DL (ref 0–1.2)
BUN SERPL-MCNC: 23 MG/DL (ref 6–23)
CALCIUM SERPL-MCNC: 9 MG/DL (ref 8.6–10.3)
CHLORIDE SERPL-SCNC: 103 MMOL/L (ref 98–107)
CO2 SERPL-SCNC: 26 MMOL/L (ref 21–32)
CREAT SERPL-MCNC: 1.56 MG/DL (ref 0.5–1.3)
DPYD GENE MUT ANL BLD/T: NORMAL
EGFRCR SERPLBLD CKD-EPI 2021: 46 ML/MIN/1.73M*2
ELECTRONICALLY SIGNED BY: NORMAL
ERYTHROCYTE [DISTWIDTH] IN BLOOD BY AUTOMATED COUNT: 14.9 % (ref 11.5–14.5)
GLUCOSE SERPL-MCNC: 82 MG/DL (ref 74–99)
HCT VFR BLD AUTO: 35.3 % (ref 41–52)
HGB BLD-MCNC: 11.9 G/DL (ref 13.5–17.5)
MAGNESIUM SERPL-MCNC: 1.8 MG/DL (ref 1.6–2.4)
MCH RBC QN AUTO: 38.9 PG (ref 26–34)
MCHC RBC AUTO-ENTMCNC: 33.7 G/DL (ref 32–36)
MCV RBC AUTO: 115 FL (ref 80–100)
NRBC BLD-RTO: 0 /100 WBCS (ref 0–0)
PLATELET # BLD AUTO: 534 X10*3/UL (ref 150–450)
POTASSIUM SERPL-SCNC: 4.4 MMOL/L (ref 3.5–5.3)
PROT SERPL-MCNC: 5.8 G/DL (ref 6.4–8.2)
RBC # BLD AUTO: 3.06 X10*6/UL (ref 4.5–5.9)
SODIUM SERPL-SCNC: 138 MMOL/L (ref 136–145)
WBC # BLD AUTO: 18.4 X10*3/UL (ref 4.4–11.3)

## 2025-08-05 PROCEDURE — 99233 SBSQ HOSP IP/OBS HIGH 50: CPT | Performed by: NURSE PRACTITIONER

## 2025-08-05 PROCEDURE — 96372 THER/PROPH/DIAG INJ SC/IM: CPT | Performed by: NURSE PRACTITIONER

## 2025-08-05 PROCEDURE — 36415 COLL VENOUS BLD VENIPUNCTURE: CPT | Performed by: NURSE PRACTITIONER

## 2025-08-05 PROCEDURE — 7100000011 HC EXTENDED STAY RECOVERY HOURLY - NURSING UNIT

## 2025-08-05 PROCEDURE — 80053 COMPREHEN METABOLIC PANEL: CPT | Performed by: NURSE PRACTITIONER

## 2025-08-05 PROCEDURE — 2500000004 HC RX 250 GENERAL PHARMACY W/ HCPCS (ALT 636 FOR OP/ED): Performed by: NURSE PRACTITIONER

## 2025-08-05 PROCEDURE — 85027 COMPLETE CBC AUTOMATED: CPT | Performed by: NURSE PRACTITIONER

## 2025-08-05 PROCEDURE — 83735 ASSAY OF MAGNESIUM: CPT | Performed by: NURSE PRACTITIONER

## 2025-08-05 RX ORDER — LISINOPRIL 20 MG/1
20 TABLET ORAL DAILY
Status: DISCONTINUED | OUTPATIENT
Start: 2025-08-06 | End: 2025-08-06 | Stop reason: HOSPADM

## 2025-08-05 RX ADMIN — HYDROXYUREA 500 MG: 500 CAPSULE ORAL at 21:07

## 2025-08-05 RX ADMIN — ONDANSETRON 8 MG: 4 TABLET, ORALLY DISINTEGRATING ORAL at 11:49

## 2025-08-05 RX ADMIN — HEPARIN SODIUM 5000 UNITS: 5000 INJECTION, SOLUTION INTRAVENOUS; SUBCUTANEOUS at 06:34

## 2025-08-05 RX ADMIN — HEPARIN SODIUM 5000 UNITS: 5000 INJECTION, SOLUTION INTRAVENOUS; SUBCUTANEOUS at 21:07

## 2025-08-05 RX ADMIN — HEPARIN SODIUM 5000 UNITS: 5000 INJECTION, SOLUTION INTRAVENOUS; SUBCUTANEOUS at 17:14

## 2025-08-05 ASSESSMENT — PAIN SCALES - GENERAL: PAINLEVEL_OUTOF10: 0 - NO PAIN

## 2025-08-05 ASSESSMENT — COGNITIVE AND FUNCTIONAL STATUS - GENERAL
DAILY ACTIVITIY SCORE: 24
CLIMB 3 TO 5 STEPS WITH RAILING: A LITTLE
WALKING IN HOSPITAL ROOM: A LITTLE
MOBILITY SCORE: 22

## 2025-08-05 ASSESSMENT — ACTIVITIES OF DAILY LIVING (ADL): LACK_OF_TRANSPORTATION: NO

## 2025-08-05 ASSESSMENT — PAIN - FUNCTIONAL ASSESSMENT: PAIN_FUNCTIONAL_ASSESSMENT: 0-10

## 2025-08-05 NOTE — CONSULTS
"Nutrition Initial Assessment:   Nutrition Assessment    Reason for Assessment: Initiate and manage tube feeding    Patient is a 75 y.o. male presenting with metastasis from esophageal cancer  pmhx of HTN, HLD, OA, polycythemia vera, and DVT on Eliquis who is referred to me by Dr Chua for evaluation and treatment of esophageal cancer       Nutrition History:  Energy Intake: Poor < 50 %  Pain affecting nutrition status: N/A  Food and Nutrient History: RDN consult for initiate and manage TF. Pt reports 30 lb wt loss in the past 1 year, more so within the past 2 months. Pt reports UBW of 190 lbs. Pt reports eating mainly liquid diet for the past 4 months due to swallowing difficulty, states he was drinking Ensure 1 x/day. Pt reports reflux at times when drinking liquids. Pt denies other diet restrictions at home. See TF recommendations below, plan for bolus feeds for home, will provide education to pt and family. Pt to continue to eat po as tolerated however pt unable to meet kcal and protein needs through po intake alone. Will continue to monitor.  Vitamin/Herbal Supplement Use: folvite, Ensure, MVI, preservision per home med list       Anthropometrics:  Height: 180.3 cm (5' 11\")   Weight: 80 kg (176 lb 5.9 oz)   BMI (Calculated): 24.61  IBW/kg (Dietitian Calculated): 78.2 kg  Percent of IBW: 102 %                      Weight History:   Wt Readings from Last 10 Encounters:   08/04/25 80 kg (176 lb 5.9 oz)   08/01/25 82.2 kg (181 lb 3.5 oz)   07/25/25 86.2 kg (190 lb)   07/23/25 86.6 kg (191 lb)   07/16/25 85 kg (187 lb 6.3 oz)   07/11/25 87.4 kg (192 lb 9.6 oz)   06/30/25 83.5 kg (184 lb)   06/17/25 90.7 kg (200 lb)   06/10/25 90.3 kg (199 lb)   10/16/24 91.1 kg (200 lb 13.4 oz)      Weight Change %:  Weight History / % Weight Change: 23 lb, 12% significant wt loss in 2 months per chart review.  Significant Weight Loss: Yes  Interpretation of Weight Loss: >5% in 1 month    Nutrition Focused Physical Exam " "Findings:    Subcutaneous Fat Loss:   Orbital Fat Pads: Mild-Moderate (slight dark circles and slight hollowing)  Buccal Fat Pads: Mild-Moderate (flat cheeks, minimal bounce)  Triceps: Well nourished (ample fat tissue)  Muscle Wasting:  Temporalis: Well nourished (well-defined muscle)  Pectoralis (Clavicular Region): Mild-Moderate (some protrusion of clavicle)  Deltoid/Trapezius: Well nourished (rounded appearance at arm, shoulder, neck)  Interosseous: Well nourished (muscle bulges)  Edema:  Edema: none  Physical Findings:  Skin: Positive (surgical wounds, negative for PI)  Mouth Findings: Dysphagia    Nutrition Significant Labs:  BMP Trend:   Results from last 7 days   Lab Units 08/05/25  0604 08/01/25  1245   GLUCOSE mg/dL 82 97   CALCIUM mg/dL 9.0 9.9   SODIUM mmol/L 138 139   POTASSIUM mmol/L 4.4 4.3   CO2 mmol/L 26 31   CHLORIDE mmol/L 103 100   BUN mg/dL 23 17   CREATININE mg/dL 1.56* 1.64*    , A1C:No results found for: \"HGBA1C\", BG POCT trend:        Nutrition Specific Medications:  Scheduled medications  Scheduled Medications[1]  Continuous medications  Continuous Medications[2]  PRN medications  PRN Medications[3]     I/O:    ;      Dietary Orders (From admission, onward)       Start     Ordered    08/04/25 1752  May Participate in Room Service  ( ROOM SERVICE MAY PARTICIPATE)  Once        Question:  .  Answer:  Yes    08/04/25 1751    08/04/25 1619  Enteral feeding WITH diet order Diet type: Clear Liquid; Tube feeding formula: Jevity 1.5; 65; 150; Water  Continuous        Comments: Initiate TF at 20 ml/hr when OK with surgical team. Advance by 10 ml every 8 hours to goal rate of 65 ml/hr x 22 hours   Question Answer Comment   Diet type Clear Liquid    Tube feeding formula: Jevity 1.5    Feeding route: PEG (percutaneous endoscopic gastric)    Tube feeding continuous rate (mL/hr): 65    Tube feeding flush (mL): 150    Flush type: Water        08/04/25 1620                     Estimated Needs:   Total " Energy Estimated Needs in 24 hours (kCal): 2000 kCal  Method for Estimating Needs: 2000 - 2400 kcal (25-30 kcal/kg ABW)  Total Protein Estimated Needs in 24 Hours (g): 96 g  Method for Estimating 24 Hour Protein Needs: 1.2 g/kg ABW  Total Fluid Estimated Needs in 24 Hours (mL): 2000 mL  Method for Estimating 24 Hour Fluid Needs: 1 ml/kcal or per MD  Patient on Order Fluid Restriction: No        Nutrition Diagnosis   Malnutrition Diagnosis  Patient has Malnutrition Diagnosis: Yes  Diagnosis Status: New  Malnutrition Diagnosis: Severe malnutrition related to chronic disease or condition  Related to: catabolic illness, dysphagia  As Evidenced by: <75% of estimated energy requirements for > 1 month, >5% wt loss in 1 month, moderate muscle wasting, moderate fat loss    Nutrition Diagnosis  Patient has Nutrition Diagnosis: Yes  Diagnosis Status (1): New  Nutrition Diagnosis 1: Swallowing difficulty  Related to (1): esophageal cancer  As Evidenced by (1): decreased po intake, need for supplemental EN to meet majority of kcal and protein needs.       Nutrition Interventions/Recommendations   Nutrition prescription for enteral nutrition, Nutrition prescription for oral nutrition    Nutrition Recommendations:  Individualized Nutrition Prescription Provided for :   Continue Clear Liquid diet as able/tolerated.   Bolus TF of Jevity 1.5, 240 ml (1 carton)  bolus 6 x/day - initiate bolus at goal of 240 ml. Flush tube with 60 ml water before and after each feed.   Pt will require and additonal 180 ml water flush between feeds 2x/day to meet fluid needs.    Nutrition Interventions/Goals:   Meals and Snacks: General healthful diet, Texture-modified diet  Goal: consumes 3 meals/day  Enteral Intake: Management of composition of enteral nutrition, Management of delivery rate of enteral nutrition, Management of concentration of enteral nutrition, Management of flushing of feeding tube, Management of volume of enteral nutrition  Goal: TF  of Jevity 1.5 240 ml bolus 6x/day to provide 2160 kcal, 91 g protein, and 1080 ml free H2O + 1080 ml from flushes  Coordination of Care with Providers: Provider, Nursing  Additional Interventions: If pt meeting fluids need po, will not require additional water flush between feeds      Education Documentation  No documentation found.    Plan for bolus TF education later this afternoon when daughter is also present        Nutrition Monitoring and Evaluation   Estimated Energy Intake: Energy intake greater or equal to 75% of estimated energy needs  Enteral and Parenteral Nutrition Intake Determination: Enteral nutrition intake - Tolerate TF at goal rate, Enteral nutrition intake - Prevent refeeding, Enteral nutrition intake - To meet > 75% estimated energy needs  Additional Plans: PO intake as tolerated    Body Weight: Body weight - Maintain stable weight    Electrolyte and Renal Panel: Electrolytes within normal limits  Glucose/Endocrine Profile: Glucose within normal limits ( mg/dL)              Time Spent (min): 60 minutes            [1] [Held by provider] apixaban, 5 mg, oral, BID  [Held by provider] atorvastatin, 20 mg, oral, Daily  heparin (porcine), 5,000 Units, subcutaneous, q8h  hydroxyurea, 500 mg, oral, BID  [START ON 8/6/2025] lisinopril, 20 mg, g-tube, Daily  [Held by provider] meloxicam, 15 mg, oral, Daily with breakfast  [Held by provider] pantoprazole, 40 mg, oral, Daily  [2]    [3] PRN medications: acetaminophen **OR** acetaminophen **OR** acetaminophen, ondansetron ODT

## 2025-08-05 NOTE — PROGRESS NOTES
[facility] Dr iqbal nurse, Merline who confirms that the [facility] and [facility] Horan will assume responsibility of tube feed orders after pt next visit- scheduled in October.  [facility] to reschedule for earlier date until then Dr Jones to follow .    They have approved pt for [facility] to proceed to follow.    Prescription orders have been sent in [facility].      Will need final confirmation and soc

## 2025-08-05 NOTE — PROGRESS NOTES
Nutrition Diet Education  Met with pt and family this afternoon to review bolus feeds via PEG tube. RN at bedside.     Education Documentation  Bolus TF education, taught by Lilian Mackey RD, LD at 8/5/2025  1:54 PM.  Learner: Family, Patient  Readiness: Acceptance  Method: Explanation, Demonstration, Handout  Response: Verbalizes Understanding  Comment: see education tab for more detail          Follow Up:     Time Spent (min): 30 minutes

## 2025-08-05 NOTE — CARE PLAN
The patient's goals for the shift include      The clinical goals for the shift include patient will report pain at a tolerable level throughout shift

## 2025-08-05 NOTE — PROGRESS NOTES
"Ilan Rosales is a 75 y.o. male on day 1 of admission presenting with Metastasis from esophageal cancer (Multi).    Subjective   76 yo with history of HTN, HLD, polycythemia vera, prior DVT on OAC with eliquis and adenocarcinoma of the esophagus, currently under workup with oncology. He has had recent development of dysphagia over the past few months, and was admitted 8/4 for elective PEG placement. Overnight course was uneventful, he is currently afebrile, hemodynamically stable. Initiated on tube feedings and tolerated well. Evaluated by dietician, given recommendations for TF boluses and water flushes. Currently working with care coordinators to arrange for home health and tube feeds at home. Anticipate discharge in am once home care issues taken care of.       Objective     Physical Exam  HENT:      Head: Normocephalic.      Nose: Nose normal.      Mouth/Throat:      Mouth: Mucous membranes are moist.     Eyes:      Pupils: Pupils are equal, round, and reactive to light.       Cardiovascular:      Rate and Rhythm: Normal rate and regular rhythm.      Heart sounds: Normal heart sounds.   Abdominal:      Palpations: Abdomen is soft.      Comments: PEG dressing dry and intact     Musculoskeletal:      Cervical back: Normal range of motion.     Skin:     General: Skin is warm and dry.     Neurological:      General: No focal deficit present.      Mental Status: He is alert and oriented to person, place, and time.     Psychiatric:         Mood and Affect: Mood normal.         Last Recorded Vitals  Blood pressure 142/69, pulse 68, temperature 36.9 °C (98.4 °F), resp. rate 18, height 1.803 m (5' 11\"), weight 80 kg (176 lb 5.9 oz), SpO2 93%.  Intake/Output last 3 Shifts:  I/O last 3 completed shifts:  In: 960.8 (12 mL/kg) [P.O.:50; I.V.:910.8 (11.4 mL/kg)]  Out: 375 (4.7 mL/kg) [Urine:375 (0.1 mL/kg/hr)]  Weight: 80 kg     Relevant Results  Scheduled medications  Scheduled Medications[1]  Continuous " medications  Continuous Medications[2]  PRN medications  PRN Medications[3]     Results for orders placed or performed during the hospital encounter of 08/04/25 (from the past 24 hours)   CBC   Result Value Ref Range    WBC 18.4 (H) 4.4 - 11.3 x10*3/uL    nRBC 0.0 0.0 - 0.0 /100 WBCs    RBC 3.06 (L) 4.50 - 5.90 x10*6/uL    Hemoglobin 11.9 (L) 13.5 - 17.5 g/dL    Hematocrit 35.3 (L) 41.0 - 52.0 %     (H) 80 - 100 fL    MCH 38.9 (H) 26.0 - 34.0 pg    MCHC 33.7 32.0 - 36.0 g/dL    RDW 14.9 (H) 11.5 - 14.5 %    Platelets 534 (H) 150 - 450 x10*3/uL   Comprehensive metabolic panel   Result Value Ref Range    Glucose 82 74 - 99 mg/dL    Sodium 138 136 - 145 mmol/L    Potassium 4.4 3.5 - 5.3 mmol/L    Chloride 103 98 - 107 mmol/L    Bicarbonate 26 21 - 32 mmol/L    Anion Gap 13 10 - 20 mmol/L    Urea Nitrogen 23 6 - 23 mg/dL    Creatinine 1.56 (H) 0.50 - 1.30 mg/dL    eGFR 46 (L) >60 mL/min/1.73m*2    Calcium 9.0 8.6 - 10.3 mg/dL    Albumin 3.4 3.4 - 5.0 g/dL    Alkaline Phosphatase 118 33 - 136 U/L    Total Protein 5.8 (L) 6.4 - 8.2 g/dL    AST 53 (H) 9 - 39 U/L    Bilirubin, Total 0.7 0.0 - 1.2 mg/dL    ALT 46 10 - 52 U/L   Magnesium   Result Value Ref Range    Magnesium 1.80 1.60 - 2.40 mg/dL                Malnutrition Diagnosis Status: New  Malnutrition Diagnosis: Severe malnutrition related to chronic disease or condition  Related to: catabolic illness, dysphagia  As Evidenced by: <75% of estimated energy requirements for > 1 month, >5% wt loss in 1 month, moderate muscle wasting, moderate fat loss  I agree with the dietitian's malnutrition diagnosis.      Assessment & Plan  Metastasis from esophageal cancer (Multi)    DVT (deep venous thrombosis) (Multi)    Dysphagia; adenocarcinoma of esophagus  Patient with history of adenocarcinoma of the esophagus, with recent dysphagia and weight loss. Underwent elective PEG placement 8/4  Discussed with Dr Jones:  -Consult nutritionist for TF bolus/water flush  recommendations  -Eliquis on hold X 48 hours  -Discharge in am once TF/HHC arrangements made      I spent 35 minutes in the professional and overall care of this patient.      Fern Ortiz, CHRISTIANO-CNP         [1] [Held by provider] apixaban, 5 mg, oral, BID  [Held by provider] atorvastatin, 20 mg, oral, Daily  heparin (porcine), 5,000 Units, subcutaneous, q8h  hydroxyurea, 500 mg, oral, BID  [START ON 8/6/2025] lisinopril, 20 mg, g-tube, Daily  [Held by provider] meloxicam, 15 mg, oral, Daily with breakfast  [Held by provider] pantoprazole, 40 mg, oral, Daily  [2]    [3] PRN medications: acetaminophen **OR** acetaminophen **OR** acetaminophen, ondansetron ODT

## 2025-08-05 NOTE — PROGRESS NOTES
08/05/25 1225   Discharge Planning   Living Arrangements Spouse/significant other   Support Systems Spouse/significant other;Children;Family members   Assistance Needed none   Type of Residence Private residence   Home or Post Acute Services In home services   Type of Home Care Services DME or oxygen;Other (Comment);Home nursing visits  (tube feeds)   Expected Discharge Disposition Home H   Housing Stability   In the last 12 months, was there a time when you were not able to pay the mortgage or rent on time? N   At any time in the past 12 months, were you homeless or living in a shelter (including now)? N   Transportation Needs   In the past 12 months, has lack of transportation kept you from medical appointments or from getting medications? no   In the past 12 months, has lack of transportation kept you from meetings, work, or from getting things needed for daily living? No   Patient Choice   Provider Choice list and CMS website (https://medicare.gov/care-compare#search) for post-acute Quality and Resource Measure Data were provided and reviewed with: Patient   Patient / Family choosing to utilize agency / facility established prior to hospitalization Yes   Stroke Family Assessment   Stroke Family Assessment Needed No   Intensity of Service   Intensity of Service 0-30 min     Pt is POD #1, Pt underwent EGD PEG - Dr Jones and Mediport placement - Dr Davis for  metastatic esophageal cancer .   Met with patient and family at bedside.  [facility]rellana with  - recent appt in may.  [facility] if [facility] but also states if unable / needed he is open to [facility] to follow.  Pt will be starting bolus tube feeds Jevity 1.5 .  Plan is for dc today/ tomorrow once tube feeds at goal rate and pt tolerating.    Attempted to reach [facility] Khalida Moreira at [phone] ext [phone] left detailed voice message requesting return call. Pt believes she may be on  vacation this week.  Also attempted to contact Dr iqbal office at [phone] and left detailed message with Physcian Nurse Merline requesting return call.    Pt states he is able to utilize his insurance and  PCP if needed for orders.    [facility] orders noted and home care team has been notified.    Referrals for tube feed have been sent to both [facility] and [facility] , Awaiting their responses.    Update: [facility] able to accept and pt is covered at 100 % with united health care insurance benefits.  [facility] has declined.    Dr Orellana deferring [facility] tube feed orders to surgery.  Per NP , Dr Jones is agreeable to follow.  [facility] has been updated.    Family has been updated at bedside.

## 2025-08-06 ENCOUNTER — DOCUMENTATION (OUTPATIENT)
Dept: HOME HEALTH SERVICES | Facility: HOME HEALTH | Age: 75
End: 2025-08-06
Payer: MEDICARE

## 2025-08-06 ENCOUNTER — PHARMACY VISIT (OUTPATIENT)
Dept: PHARMACY | Facility: CLINIC | Age: 75
End: 2025-08-06
Payer: COMMERCIAL

## 2025-08-06 VITALS
RESPIRATION RATE: 12 BRPM | SYSTOLIC BLOOD PRESSURE: 128 MMHG | TEMPERATURE: 98.2 F | HEART RATE: 69 BPM | BODY MASS INDEX: 24.69 KG/M2 | DIASTOLIC BLOOD PRESSURE: 78 MMHG | WEIGHT: 176.37 LBS | OXYGEN SATURATION: 93 % | HEIGHT: 71 IN

## 2025-08-06 PROCEDURE — 2500000004 HC RX 250 GENERAL PHARMACY W/ HCPCS (ALT 636 FOR OP/ED): Performed by: NURSE PRACTITIONER

## 2025-08-06 PROCEDURE — 99239 HOSP IP/OBS DSCHRG MGMT >30: CPT | Performed by: NURSE PRACTITIONER

## 2025-08-06 PROCEDURE — 2500000001 HC RX 250 WO HCPCS SELF ADMINISTERED DRUGS (ALT 637 FOR MEDICARE OP): Performed by: NURSE PRACTITIONER

## 2025-08-06 PROCEDURE — 96372 THER/PROPH/DIAG INJ SC/IM: CPT | Performed by: NURSE PRACTITIONER

## 2025-08-06 PROCEDURE — RXMED WILLOW AMBULATORY MEDICATION CHARGE

## 2025-08-06 PROCEDURE — 7100000011 HC EXTENDED STAY RECOVERY HOURLY - NURSING UNIT

## 2025-08-06 RX ORDER — ACETAMINOPHEN 325 MG/1
650 TABLET ORAL EVERY 4 HOURS PRN
Qty: 30 TABLET | Refills: 0 | Status: SHIPPED | OUTPATIENT
Start: 2025-08-06

## 2025-08-06 RX ORDER — FOLIC ACID 1 MG/1
1 TABLET ORAL DAILY
Status: DISCONTINUED | OUTPATIENT
Start: 2025-08-06 | End: 2025-08-06 | Stop reason: HOSPADM

## 2025-08-06 RX ADMIN — LISINOPRIL 20 MG: 20 TABLET ORAL at 09:10

## 2025-08-06 RX ADMIN — HYDROXYUREA 500 MG: 500 CAPSULE ORAL at 09:10

## 2025-08-06 RX ADMIN — HEPARIN SODIUM 5000 UNITS: 5000 INJECTION, SOLUTION INTRAVENOUS; SUBCUTANEOUS at 06:17

## 2025-08-06 ASSESSMENT — PAIN SCALES - GENERAL
PAIN_LEVEL: 0
PAINLEVEL_OUTOF10: 0 - NO PAIN

## 2025-08-06 ASSESSMENT — PAIN - FUNCTIONAL ASSESSMENT: PAIN_FUNCTIONAL_ASSESSMENT: 0-10

## 2025-08-06 NOTE — HH CARE COORDINATION
Home Care received a Referral for Nursing. We have processed the referral for a Start of Care within 48 hours of 08/07/2025.     If you have any questions or concerns, please feel free to contact us at [phone]. Follow the prompts, enter your five digit zip code, and you will be directed to your care team on [facility] 1.

## 2025-08-06 NOTE — ANESTHESIA POSTPROCEDURE EVALUATION
Patient: Ilan Rosales    Procedure Summary       Date: 08/04/25 Room / Location: [facility] 01 / [facility]    Anesthesia Start: 1450 Anesthesia Stop: 1606    Procedures:       EGD, PEG - pediatric gastroscope, possible open gastrostomy      INSERTION, CENTRAL VENOUS ACCESS DEVICE, WITH SUBCUTANEOUS PORT Diagnosis:       Metastasis from esophageal cancer (Multi)      (Metastasis from esophageal cancer (Multi) [C79.9, C15.9])    Surgeons: Ellen Jones MD; Duy Davis MD Responsible Provider: Todd Kennedy DO    Anesthesia Type: general ASA Status: 3            Anesthesia Type: general    Vitals Value Taken Time   /76 08/04/25 17:12   Temp 36.1 °C (97 °F) 08/04/25 16:50   Pulse 66 08/04/25 17:18   Resp 12 08/04/25 17:18   SpO2 96 % 08/04/25 17:19   Vitals shown include unfiled device data.    Anesthesia Post Evaluation    Patient location during evaluation: bedside  Patient participation: complete - patient participated  Level of consciousness: awake and alert  Pain score: 0  Pain management: adequate  Airway patency: patent  Cardiovascular status: acceptable  Respiratory status: acceptable  Hydration status: acceptable  Postoperative Nausea and Vomiting: none        No notable events documented.

## 2025-08-06 NOTE — PROGRESS NOTES
Nutrition Note:  Received secure chat from RN regarding high gastric residual this morning. Pt had nausea and vomiting when trying to take po, pt reported good tolerance to TF bolus this morning. Suspect vomiting due to esophageal tumor vs GI symptoms. Recommend not checking residuals unless pt is symptomatic (nausea/vomiting after bolus, abdominal distention, etc.).    Met with pt and his wife who deny further questions regarding bolus feeds. Pt states he has been tolerating bolus regimen well, denies concerns at this time. Will continue to monitor. Plan for discharge home today.

## 2025-08-06 NOTE — DISCHARGE SUMMARY
Discharge Diagnosis  Metastasis from esophageal cancer (Multi)    Issues Requiring Follow-Up  Esophageal adenocarcinoma    Test Results Pending At Discharge  Pending Labs       No current pending labs.            Hospital Course   This is a 75-year-old male with past medical history of hypertension, hyperlipidemia, polycythemia vera, DVT on Eliquis anticoagulation and recently diagnosed adenocarcinoma of the esophagus.  Currently under staging workup with oncology, and is scheduled to begin chemoradiation.  He has had 4 months of dysphagia and has lost over 30 pounds.  Underwent elective EGD, PEG placement and mediport placement on 8/4. Tolerated initiation of tube feedings well, and was kept until 8/6 to allow for nutritionist assessment and home health services to be arranged. At time of discharge he is afebrile, hemodynamically stable.  Tolerating transition to bolus feedings well, home health services have been arranged.  Plan to resume Eliquis in the a.m.  No reports of pain.  Plan for discharge to home with outpatient follow-up with oncology, further follow-up with thoracic surgery after staging workup and initial chemoradiation completed.    Visit Vitals  /76 (Patient Position: Sitting)   Pulse 69   Temp 36.9 °C (98.4 °F)   Resp 18     Vitals:    08/04/25 1015   Weight: 80 kg (176 lb 5.9 oz)       Immunization History   Administered Date(s) Administered    COVID-19, mRNA, LNP-S, PF, 30 mcg/0.3 mL dose 02/09/2021, 02/26/2021, 10/30/2021       Results      Pertinent Physical Exam At Time of Discharge  Physical Exam  Constitutional:       General: He is not in acute distress.     Comments: Cachectic appearance   HENT:      Head: Normocephalic.      Nose: Nose normal.      Mouth/Throat:      Mouth: Mucous membranes are moist.     Eyes:      Pupils: Pupils are equal, round, and reactive to light.       Cardiovascular:      Rate and Rhythm: Normal rate and regular rhythm.      Pulses: Normal pulses.       Heart sounds: Normal heart sounds.   Pulmonary:      Effort: Pulmonary effort is normal.      Breath sounds: Normal breath sounds.   Abdominal:      General: There is no distension.      Palpations: Abdomen is soft.      Tenderness: There is no abdominal tenderness.      Comments: PEG in place dressing dry and intact     Musculoskeletal:      Cervical back: Normal range of motion.      Right lower leg: No edema.      Left lower leg: No edema.     Skin:     General: Skin is warm and dry.     Neurological:      General: No focal deficit present.      Mental Status: He is alert and oriented to person, place, and time.     Psychiatric:         Mood and Affect: Mood normal.         Home Medications     Medication List      PAUSE taking these medications     * apixaban 5 mg tablet; Wait to take this until your doctor or other   care provider tells you to start again.; Commonly known as: Eliquis; You   also have another medication with the same name that you may need to   continue taking.   atorvastatin 40 mg tablet; Wait to take this until your doctor or other   care provider tells you to start again.; Commonly known as: Lipitor  * This list has 1 medication(s) that are the same as other medications   prescribed for you. Read the directions carefully, and ask your doctor or   other care provider to review them with you.     START taking these medications     acetaminophen 325 mg tablet; Commonly known as: Tylenol; Take 2 tablets   (650 mg) by mouth every 4 hours if needed for mild pain (1 - 3).     CHANGE how you take these medications     * apixaban 5 mg tablet; Commonly known as: Eliquis; Take 1 tablet (5 mg)   by mouth 2 times a day.; What changed: Another medication with the same   name was paused. Ask your nurse or doctor if you should take this   medication.  * This list has 1 medication(s) that are the same as other medications   prescribed for you. Read the directions carefully, and ask your doctor or   other care  provider to review them with you.     CONTINUE taking these medications     ENSURE COMPLETE ORAL   folic acid 1 mg tablet; Commonly known as: Folvite   hydroxyurea 500 mg capsule; Commonly known as: Hydrea   lisinopril 20 mg tablet   meloxicam 15 mg tablet; Commonly known as: Mobic; Take 1 tablet (15 mg)   by mouth once daily with breakfast.   multivitamin tablet   ondansetron ODT 8 mg disintegrating tablet; Commonly known as:   Zofran-ODT; Dissolve 1 tablet (8 mg) in the mouth every 8 hours if needed   for nausea or vomiting (Chemotherapy Premedication).   pantoprazole 40 mg EC tablet; Commonly known as: ProtoNix; Take 1 tablet   (40 mg) by mouth once daily.   PreserVision AREDS 4,296 mcg-226 mg-90 mg capsule; Generic drug:   vitamins A,C,E-zinc-copper       Outpatient Follow-Up  Future Appointments   Date Time Provider Department Center   [facility]   8/13/2025  2:[address] [facility]   8/22/2025 10:[address] [facility]   8/25/2025 10:00 [facility]   8/25/2025 10:30 [facility]   8/27/2025  2:00 PM [address]       CHRISTIANO Nielsen-CNP

## 2025-08-06 NOTE — PROGRESS NOTES
DC orders are in. Received message from [facility] to confirm delivery of supplies. Pt has been able to tolerate TF at goal rate. [facility]. Pt also has a box of Jevity 1.5 in his room with several piston syringes for bolus feeds. RD met with pt and wife yesterday to review bolus feeds per PEG. Pt and wife deny any concerns at this time regarding discharge. Pt to start chemo on Monday. Call placed to [facility] and spoke to Moni, notifying her that the pt is still in need of piston syringes but that they received a large supply of tube feed formula. Elayne stated that since the pt has already [facility] the [facility], they will need to get all supplies through the [facility]. [facility] will cancel the referral. This writer asked to hold on cancelling to confirm with the [facility] that pt will continue to receive tube feed and supplies through the [facility]. Call placed to Khalida Decker RD at the [phone] message with call back number as there was no answer. For now, pt has enough supplies  and formula to discharge home.    UPDATE- nurse in to give DC instructions. Additional call placed to PARIS Rick [facility] to inquire about [facility] providing supplies/TF formula with no success. Provided pt spouse with phone number to [facility] if there is any issue with [facility] ongoing delivery. Also educated her that she can discuss any issues with [facility] nurse as well as reach out to care transitions department at [facility] if there are any concerns. Notified [facility] that they can cancel the referral. Confirmed with [facility] that SOC is tomorrow. Wife and pt notified.    UPDATE- received [facility] back [facility]. She confirms that pt will continue to receive deliveries from the [facility] for all tube feeds and supplies (piston syringes and split sponges) moving forward. Wife will need to call Merline directly when getting low on supplies. Wife, pt, and [facility]  notified.

## 2025-08-06 NOTE — DISCHARGE INSTRUCTIONS
Nutrition Recommendations:  Individualized Nutrition Prescription Provided for :   Continue Clear Liquid diet as able/tolerated.   Bolus TF of Jevity 1.5, 240 ml (1 carton)  bolus 6 x/day - initiate bolus at goal of 240 ml. Flush tube with 60 ml water before and after each feed.   Pt will require and additonal 180 ml water flush between feeds 2x/day to meet fluid needs.

## 2025-08-07 ENCOUNTER — APPOINTMENT (OUTPATIENT)
Dept: GASTROENTEROLOGY | Facility: CLINIC | Age: 75
End: 2025-08-07
Payer: MEDICARE

## 2025-08-07 ENCOUNTER — HOME CARE VISIT (OUTPATIENT)
Dept: HOME HEALTH SERVICES | Facility: HOME HEALTH | Age: 75
End: 2025-08-07
Payer: MEDICARE

## 2025-08-07 VITALS
BODY MASS INDEX: 25.34 KG/M2 | RESPIRATION RATE: 16 BRPM | HEIGHT: 71 IN | TEMPERATURE: 97.2 F | DIASTOLIC BLOOD PRESSURE: 66 MMHG | HEART RATE: 71 BPM | SYSTOLIC BLOOD PRESSURE: 118 MMHG | OXYGEN SATURATION: 96 % | WEIGHT: 181 LBS

## 2025-08-07 PROCEDURE — G0299 HHS/HOSPICE OF RN EA 15 MIN: HCPCS

## 2025-08-07 ASSESSMENT — PAIN SCALES - PAIN ASSESSMENT IN ADVANCED DEMENTIA (PAINAD)
FACIALEXPRESSION: 0 - SMILING OR INEXPRESSIVE.
BREATHING: 0
CONSOLABILITY: 0
NEGVOCALIZATION: 0 - NONE.
NEGVOCALIZATION: 0
BODYLANGUAGE: 0
TOTALSCORE: 0
CONSOLABILITY: 0 - NO NEED TO CONSOLE.
FACIALEXPRESSION: 0
BODYLANGUAGE: 0 - RELAXED.

## 2025-08-07 ASSESSMENT — ENCOUNTER SYMPTOMS
STOOL FREQUENCY: LESS THAN DAILY
MUSCLE WEAKNESS: 1
PAIN: 1
PAIN LOCATION - EXACERBATING FACTORS: COUGH
CHANGE IN APPETITE: UNCHANGED
AGGRESSION WITHIN DEFINED LIMITS: 1
PAIN LOCATION - PAIN SEVERITY: 4/10
PAIN SEVERITY GOAL: 0/10
SUBJECTIVE PAIN PROGRESSION: UNCHANGED
PAIN LOCATION - PAIN DURATION: INTERMITTENT
PAIN LOCATION - PAIN FREQUENCY: INTERMITTENT
DEPRESSION: 0
PAIN LOCATION - RELIEVING FACTORS: REST/RX
PAIN LOCATION: ABDOMEN
BOWEL PATTERN NORMAL: 1
APPETITE LEVEL: FAIR
LAST BOWEL MOVEMENT: 67423
HIGHEST PAIN SEVERITY IN PAST 24 HOURS: 7/10
PERSON REPORTING PAIN: PATIENT
PAIN LOCATION - PAIN QUALITY: ACHE
SLEEP QUALITY: ADEQUATE
HYPERTENSION: 1
LOWEST PAIN SEVERITY IN PAST 24 HOURS: 4/10
OCCASIONAL FEELINGS OF UNSTEADINESS: 0
LOSS OF SENSATION IN FEET: 0
ANGER WITHIN DEFINED LIMITS: 1

## 2025-08-07 ASSESSMENT — ACTIVITIES OF DAILY LIVING (ADL)
CURRENT_FUNCTION: INDEPENDENT
PHYSICAL TRANSFERS ASSESSED: 1
AMBULATION ASSISTANCE: INDEPENDENT
ENTERING_EXITING_HOME: NEEDS ASSISTANCE
AMBULATION ASSISTANCE: 1
OASIS_M1830: 03

## 2025-08-11 ENCOUNTER — INFUSION (OUTPATIENT)
Dept: HEMATOLOGY/ONCOLOGY | Facility: CLINIC | Age: 75
End: 2025-08-11
Payer: MEDICARE

## 2025-08-11 ENCOUNTER — NUTRITION (OUTPATIENT)
Dept: HEMATOLOGY/ONCOLOGY | Facility: CLINIC | Age: 75
End: 2025-08-11

## 2025-08-11 VITALS — HEIGHT: 71 IN | BODY MASS INDEX: 25.43 KG/M2 | WEIGHT: 181.66 LBS

## 2025-08-11 VITALS
OXYGEN SATURATION: 94 % | TEMPERATURE: 98.4 F | BODY MASS INDEX: 25.34 KG/M2 | DIASTOLIC BLOOD PRESSURE: 73 MMHG | SYSTOLIC BLOOD PRESSURE: 131 MMHG | RESPIRATION RATE: 17 BRPM | WEIGHT: 181.66 LBS | HEART RATE: 76 BPM

## 2025-08-11 DIAGNOSIS — C15.9 METASTASIS FROM ESOPHAGEAL CANCER (MULTI): ICD-10-CM

## 2025-08-11 DIAGNOSIS — C15.5 MALIGNANT NEOPLASM OF LOWER THIRD OF ESOPHAGUS (MULTI): ICD-10-CM

## 2025-08-11 DIAGNOSIS — C79.9 METASTASIS FROM ESOPHAGEAL CANCER (MULTI): ICD-10-CM

## 2025-08-11 DIAGNOSIS — C15.5 MALIGNANT NEOPLASM OF LOWER THIRD OF ESOPHAGUS (MULTI): Primary | ICD-10-CM

## 2025-08-11 PROCEDURE — 96411 CHEMO IV PUSH ADDL DRUG: CPT

## 2025-08-11 PROCEDURE — 2500000004 HC RX 250 GENERAL PHARMACY W/ HCPCS (ALT 636 FOR OP/ED): Performed by: INTERNAL MEDICINE

## 2025-08-11 PROCEDURE — 96368 THER/DIAG CONCURRENT INF: CPT

## 2025-08-11 PROCEDURE — 96415 CHEMO IV INFUSION ADDL HR: CPT

## 2025-08-11 PROCEDURE — 96416 CHEMO PROLONG INFUSE W/PUMP: CPT

## 2025-08-11 PROCEDURE — 96375 TX/PRO/DX INJ NEW DRUG ADDON: CPT | Mod: INF

## 2025-08-11 PROCEDURE — 96413 CHEMO IV INFUSION 1 HR: CPT

## 2025-08-11 RX ORDER — HEPARIN SODIUM,PORCINE/PF 10 UNIT/ML
50 SYRINGE (ML) INTRAVENOUS AS NEEDED
OUTPATIENT
Start: 2025-08-11

## 2025-08-11 RX ORDER — FAMOTIDINE 10 MG/ML
20 INJECTION, SOLUTION INTRAVENOUS ONCE AS NEEDED
Status: DISCONTINUED | OUTPATIENT
Start: 2025-08-11 | End: 2025-08-11 | Stop reason: HOSPADM

## 2025-08-11 RX ORDER — LORAZEPAM 2 MG/ML
1 INJECTION INTRAMUSCULAR AS NEEDED
Status: DISCONTINUED | OUTPATIENT
Start: 2025-08-11 | End: 2025-08-11 | Stop reason: HOSPADM

## 2025-08-11 RX ORDER — HEPARIN 100 UNIT/ML
500 SYRINGE INTRAVENOUS AS NEEDED
OUTPATIENT
Start: 2025-08-11

## 2025-08-11 RX ORDER — PROCHLORPERAZINE MALEATE 10 MG
10 TABLET ORAL EVERY 6 HOURS PRN
Status: DISCONTINUED | OUTPATIENT
Start: 2025-08-11 | End: 2025-08-11 | Stop reason: HOSPADM

## 2025-08-11 RX ORDER — FLUOROURACIL 50 MG/ML
400 INJECTION, SOLUTION INTRAVENOUS ONCE
Status: COMPLETED | OUTPATIENT
Start: 2025-08-11 | End: 2025-08-11

## 2025-08-11 RX ORDER — ALBUTEROL SULFATE 0.83 MG/ML
3 SOLUTION RESPIRATORY (INHALATION) AS NEEDED
Status: DISCONTINUED | OUTPATIENT
Start: 2025-08-11 | End: 2025-08-11 | Stop reason: HOSPADM

## 2025-08-11 RX ORDER — PROCHLORPERAZINE EDISYLATE 5 MG/ML
10 INJECTION INTRAMUSCULAR; INTRAVENOUS EVERY 6 HOURS PRN
Status: DISCONTINUED | OUTPATIENT
Start: 2025-08-11 | End: 2025-08-11 | Stop reason: HOSPADM

## 2025-08-11 RX ORDER — FAMOTIDINE 10 MG/ML
20 INJECTION, SOLUTION INTRAVENOUS ONCE
Status: COMPLETED | OUTPATIENT
Start: 2025-08-11 | End: 2025-08-11

## 2025-08-11 RX ORDER — ONDANSETRON HYDROCHLORIDE 2 MG/ML
8 INJECTION, SOLUTION INTRAVENOUS ONCE
Status: COMPLETED | OUTPATIENT
Start: 2025-08-11 | End: 2025-08-11

## 2025-08-11 RX ORDER — DIPHENHYDRAMINE HYDROCHLORIDE 50 MG/ML
50 INJECTION, SOLUTION INTRAMUSCULAR; INTRAVENOUS AS NEEDED
Status: DISCONTINUED | OUTPATIENT
Start: 2025-08-11 | End: 2025-08-11 | Stop reason: HOSPADM

## 2025-08-11 RX ORDER — EPINEPHRINE 0.3 MG/.3ML
0.3 INJECTION SUBCUTANEOUS EVERY 5 MIN PRN
Status: DISCONTINUED | OUTPATIENT
Start: 2025-08-11 | End: 2025-08-11 | Stop reason: HOSPADM

## 2025-08-11 RX ADMIN — FLUOROURACIL 800 MG: 50 INJECTION, SOLUTION INTRAVENOUS at 16:04

## 2025-08-11 RX ADMIN — DEXAMETHASONE SODIUM PHOSPHATE 12 MG: 10 INJECTION, SOLUTION INTRAMUSCULAR; INTRAVENOUS at 12:59

## 2025-08-11 RX ADMIN — FAMOTIDINE 20 MG: 10 INJECTION INTRAVENOUS at 12:52

## 2025-08-11 RX ADMIN — ONDANSETRON 8 MG: 2 INJECTION INTRAMUSCULAR; INTRAVENOUS at 12:56

## 2025-08-11 RX ADMIN — OXALIPLATIN 175 MG: 5 INJECTION, SOLUTION INTRAVENOUS at 13:52

## 2025-08-11 RX ADMIN — LEUCOVORIN CALCIUM 820 MG: 350 INJECTION, POWDER, LYOPHILIZED, FOR SOLUTION INTRAMUSCULAR; INTRAVENOUS at 13:47

## 2025-08-11 RX ADMIN — FLUOROURACIL 4850 MG: 50 INJECTION, SOLUTION INTRAVENOUS at 16:21

## 2025-08-11 ASSESSMENT — PAIN SCALES - GENERAL: PAINLEVEL_OUTOF10: 0-NO PAIN

## 2025-08-12 PROBLEM — D45 POLYCYTHEMIA VERA: Status: ACTIVE | Noted: 2025-08-12

## 2025-08-12 ASSESSMENT — ENCOUNTER SYMPTOMS
MUSCULOSKELETAL NEGATIVE: 1
CARDIOVASCULAR NEGATIVE: 1
PSYCHIATRIC NEGATIVE: 1
RESPIRATORY NEGATIVE: 1
GASTROINTESTINAL NEGATIVE: 1
UNEXPECTED WEIGHT CHANGE: 1
EYES NEGATIVE: 1
HEMATOLOGIC/LYMPHATIC NEGATIVE: 1
FATIGUE: 1
NEUROLOGICAL NEGATIVE: 1
TROUBLE SWALLOWING: 1
ENDOCRINE NEGATIVE: 1

## 2025-08-13 ENCOUNTER — INFUSION (OUTPATIENT)
Dept: HEMATOLOGY/ONCOLOGY | Facility: CLINIC | Age: 75
End: 2025-08-13
Payer: MEDICARE

## 2025-08-13 ENCOUNTER — EDUCATION (OUTPATIENT)
Dept: HEMATOLOGY/ONCOLOGY | Facility: CLINIC | Age: 75
End: 2025-08-13

## 2025-08-13 VITALS
SYSTOLIC BLOOD PRESSURE: 135 MMHG | DIASTOLIC BLOOD PRESSURE: 75 MMHG | OXYGEN SATURATION: 95 % | TEMPERATURE: 97.9 F | WEIGHT: 186.51 LBS | RESPIRATION RATE: 18 BRPM | HEART RATE: 68 BPM | BODY MASS INDEX: 25.85 KG/M2

## 2025-08-13 DIAGNOSIS — C79.9 METASTASIS FROM ESOPHAGEAL CANCER (MULTI): ICD-10-CM

## 2025-08-13 DIAGNOSIS — C15.5 MALIGNANT NEOPLASM OF LOWER THIRD OF ESOPHAGUS (MULTI): ICD-10-CM

## 2025-08-13 DIAGNOSIS — C15.9 METASTASIS FROM ESOPHAGEAL CANCER (MULTI): ICD-10-CM

## 2025-08-13 PROCEDURE — 2500000004 HC RX 250 GENERAL PHARMACY W/ HCPCS (ALT 636 FOR OP/ED): Performed by: INTERNAL MEDICINE

## 2025-08-13 PROCEDURE — 96374 THER/PROPH/DIAG INJ IV PUSH: CPT | Mod: INF

## 2025-08-13 RX ORDER — DIPHENHYDRAMINE HYDROCHLORIDE 50 MG/ML
50 INJECTION, SOLUTION INTRAMUSCULAR; INTRAVENOUS AS NEEDED
OUTPATIENT
Start: 2025-08-25

## 2025-08-13 RX ORDER — PALONOSETRON 0.05 MG/ML
0.25 INJECTION, SOLUTION INTRAVENOUS ONCE
OUTPATIENT
Start: 2025-08-25

## 2025-08-13 RX ORDER — PROCHLORPERAZINE MALEATE 10 MG
10 TABLET ORAL EVERY 6 HOURS PRN
OUTPATIENT
Start: 2025-08-26

## 2025-08-13 RX ORDER — DIPHENHYDRAMINE HYDROCHLORIDE 50 MG/ML
50 INJECTION, SOLUTION INTRAMUSCULAR; INTRAVENOUS AS NEEDED
OUTPATIENT
Start: 2025-09-08

## 2025-08-13 RX ORDER — PROCHLORPERAZINE MALEATE 10 MG
10 TABLET ORAL EVERY 6 HOURS PRN
OUTPATIENT
Start: 2025-08-25

## 2025-08-13 RX ORDER — EPINEPHRINE 0.3 MG/.3ML
0.3 INJECTION SUBCUTANEOUS EVERY 5 MIN PRN
OUTPATIENT
Start: 2025-08-26

## 2025-08-13 RX ORDER — FAMOTIDINE 10 MG/ML
20 INJECTION, SOLUTION INTRAVENOUS ONCE AS NEEDED
OUTPATIENT
Start: 2025-09-08

## 2025-08-13 RX ORDER — EPINEPHRINE 0.3 MG/.3ML
0.3 INJECTION SUBCUTANEOUS EVERY 5 MIN PRN
OUTPATIENT
Start: 2025-09-08

## 2025-08-13 RX ORDER — ALBUTEROL SULFATE 0.83 MG/ML
3 SOLUTION RESPIRATORY (INHALATION) AS NEEDED
OUTPATIENT
Start: 2025-09-08

## 2025-08-13 RX ORDER — FAMOTIDINE 10 MG/ML
20 INJECTION, SOLUTION INTRAVENOUS ONCE AS NEEDED
OUTPATIENT
Start: 2025-08-25

## 2025-08-13 RX ORDER — PALONOSETRON 0.05 MG/ML
0.25 INJECTION, SOLUTION INTRAVENOUS ONCE
Status: COMPLETED | OUTPATIENT
Start: 2025-08-13 | End: 2025-08-13

## 2025-08-13 RX ORDER — PROCHLORPERAZINE EDISYLATE 5 MG/ML
10 INJECTION INTRAMUSCULAR; INTRAVENOUS EVERY 6 HOURS PRN
OUTPATIENT
Start: 2025-09-08

## 2025-08-13 RX ORDER — ALBUTEROL SULFATE 0.83 MG/ML
3 SOLUTION RESPIRATORY (INHALATION) AS NEEDED
OUTPATIENT
Start: 2025-08-26

## 2025-08-13 RX ORDER — LORAZEPAM 2 MG/ML
1 INJECTION INTRAMUSCULAR AS NEEDED
OUTPATIENT
Start: 2025-08-26

## 2025-08-13 RX ORDER — EPINEPHRINE 0.3 MG/.3ML
0.3 INJECTION SUBCUTANEOUS EVERY 5 MIN PRN
OUTPATIENT
Start: 2025-08-25

## 2025-08-13 RX ORDER — PROCHLORPERAZINE EDISYLATE 5 MG/ML
10 INJECTION INTRAMUSCULAR; INTRAVENOUS EVERY 6 HOURS PRN
OUTPATIENT
Start: 2025-08-25

## 2025-08-13 RX ORDER — ALBUTEROL SULFATE 0.83 MG/ML
3 SOLUTION RESPIRATORY (INHALATION) AS NEEDED
OUTPATIENT
Start: 2025-08-25

## 2025-08-13 RX ORDER — DIPHENHYDRAMINE HYDROCHLORIDE 50 MG/ML
50 INJECTION, SOLUTION INTRAMUSCULAR; INTRAVENOUS AS NEEDED
OUTPATIENT
Start: 2025-08-26

## 2025-08-13 RX ORDER — PROCHLORPERAZINE MALEATE 10 MG
10 TABLET ORAL EVERY 6 HOURS PRN
Qty: 30 TABLET | Refills: 3 | Status: SHIPPED | OUTPATIENT
Start: 2025-08-13

## 2025-08-13 RX ORDER — LORAZEPAM 2 MG/ML
1 INJECTION INTRAMUSCULAR AS NEEDED
OUTPATIENT
Start: 2025-09-08

## 2025-08-13 RX ORDER — DEXAMETHASONE 4 MG/1
8 TABLET ORAL DAILY
Qty: 6 TABLET | Refills: 5 | Status: SHIPPED | OUTPATIENT
Start: 2025-08-13

## 2025-08-13 RX ORDER — PROCHLORPERAZINE EDISYLATE 5 MG/ML
10 INJECTION INTRAMUSCULAR; INTRAVENOUS EVERY 6 HOURS PRN
OUTPATIENT
Start: 2025-08-26

## 2025-08-13 RX ORDER — PROCHLORPERAZINE MALEATE 10 MG
10 TABLET ORAL EVERY 6 HOURS PRN
OUTPATIENT
Start: 2025-09-08

## 2025-08-13 RX ORDER — OLANZAPINE 5 MG/1
5 TABLET, FILM COATED ORAL NIGHTLY
Qty: 4 TABLET | Refills: 5 | Status: SHIPPED | OUTPATIENT
Start: 2025-08-13

## 2025-08-13 RX ORDER — PALONOSETRON 0.05 MG/ML
0.25 INJECTION, SOLUTION INTRAVENOUS ONCE
OUTPATIENT
Start: 2025-09-08

## 2025-08-13 RX ORDER — FAMOTIDINE 10 MG/ML
20 INJECTION, SOLUTION INTRAVENOUS ONCE AS NEEDED
OUTPATIENT
Start: 2025-08-26

## 2025-08-13 RX ADMIN — PALONOSETRON HYDROCHLORIDE 0.25 MG: 0.25 INJECTION INTRAVENOUS at 14:29

## 2025-08-13 ASSESSMENT — PAIN SCALES - GENERAL: PAINLEVEL_OUTOF10: 0-NO PAIN

## 2025-08-15 ENCOUNTER — HOME CARE VISIT (OUTPATIENT)
Dept: HOME HEALTH SERVICES | Facility: HOME HEALTH | Age: 75
End: 2025-08-15
Payer: MEDICARE

## 2025-08-15 VITALS
HEART RATE: 66 BPM | RESPIRATION RATE: 18 BRPM | OXYGEN SATURATION: 95 % | SYSTOLIC BLOOD PRESSURE: 122 MMHG | TEMPERATURE: 97.4 F | DIASTOLIC BLOOD PRESSURE: 74 MMHG

## 2025-08-15 PROCEDURE — G0299 HHS/HOSPICE OF RN EA 15 MIN: HCPCS

## 2025-08-15 SDOH — ECONOMIC STABILITY: GENERAL

## 2025-08-15 ASSESSMENT — ENCOUNTER SYMPTOMS
DEPRESSION: 0
FREQUENCY: 1
LOSS OF SENSATION IN FEET: 0
PERSON REPORTING PAIN: PATIENT
HYPERTENSION: 1
STOOL FREQUENCY: DAILY
OCCASIONAL FEELINGS OF UNSTEADINESS: 0
LAST BOWEL MOVEMENT: 67431
BOWEL PATTERN NORMAL: 1
DENIES PAIN: 1

## 2025-08-15 ASSESSMENT — ACTIVITIES OF DAILY LIVING (ADL)
AMBULATION ASSISTANCE: 1
MONEY MANAGEMENT (EXPENSES/BILLS): INDEPENDENT
AMBULATION ASSISTANCE: INDEPENDENT
PHYSICAL TRANSFERS ASSESSED: 1
CURRENT_FUNCTION: INDEPENDENT

## 2025-08-15 ASSESSMENT — PAIN SCALES - PAIN ASSESSMENT IN ADVANCED DEMENTIA (PAINAD)
BODYLANGUAGE: 0
BODYLANGUAGE: 0 - RELAXED.
CONSOLABILITY: 0 - NO NEED TO CONSOLE.
NEGVOCALIZATION: 0
NEGVOCALIZATION: 0 - NONE.
TOTALSCORE: 0
FACIALEXPRESSION: 0
CONSOLABILITY: 0
BREATHING: 0
FACIALEXPRESSION: 0 - SMILING OR INEXPRESSIVE.

## 2025-08-18 ENCOUNTER — TELEPHONE (OUTPATIENT)
Dept: HEMATOLOGY/ONCOLOGY | Facility: CLINIC | Age: 75
End: 2025-08-18
Payer: MEDICARE

## 2025-08-19 ENCOUNTER — HOME CARE VISIT (OUTPATIENT)
Dept: HOME HEALTH SERVICES | Facility: HOME HEALTH | Age: 75
End: 2025-08-19
Payer: MEDICARE

## 2025-08-19 VITALS
TEMPERATURE: 97 F | RESPIRATION RATE: 16 BRPM | DIASTOLIC BLOOD PRESSURE: 68 MMHG | HEART RATE: 75 BPM | SYSTOLIC BLOOD PRESSURE: 120 MMHG | OXYGEN SATURATION: 98 %

## 2025-08-19 DIAGNOSIS — Z79.01 ANTICOAGULATED ON ELIQUIS: Primary | ICD-10-CM

## 2025-08-19 PROCEDURE — G0299 HHS/HOSPICE OF RN EA 15 MIN: HCPCS

## 2025-08-19 SDOH — ECONOMIC STABILITY: GENERAL

## 2025-08-19 ASSESSMENT — PAIN SCALES - PAIN ASSESSMENT IN ADVANCED DEMENTIA (PAINAD)
FACIALEXPRESSION: 0
FACIALEXPRESSION: 0 - SMILING OR INEXPRESSIVE.
CONSOLABILITY: 0
NEGVOCALIZATION: 0 - NONE.
CONSOLABILITY: 0 - NO NEED TO CONSOLE.
BODYLANGUAGE: 0 - RELAXED.
NEGVOCALIZATION: 0
BREATHING: 0
BODYLANGUAGE: 0
TOTALSCORE: 0

## 2025-08-19 ASSESSMENT — ENCOUNTER SYMPTOMS
MUSCLE WEAKNESS: 1
STOOL FREQUENCY: DAILY
DENIES PAIN: 1
DEPRESSION: 0
APPETITE LEVEL: FAIR
LAST BOWEL MOVEMENT: 67436
OCCASIONAL FEELINGS OF UNSTEADINESS: 0
DIARRHEA: 1
LOSS OF SENSATION IN FEET: 0
HYPERTENSION: 1
PERSON REPORTING PAIN: PATIENT

## 2025-08-19 ASSESSMENT — ACTIVITIES OF DAILY LIVING (ADL)
MONEY MANAGEMENT (EXPENSES/BILLS): INDEPENDENT
PHYSICAL TRANSFERS ASSESSED: 1
AMBULATION ASSISTANCE: INDEPENDENT
AMBULATION ASSISTANCE: 1
CURRENT_FUNCTION: INDEPENDENT

## 2025-08-22 ENCOUNTER — INFUSION (OUTPATIENT)
Dept: HEMATOLOGY/ONCOLOGY | Facility: CLINIC | Age: 75
End: 2025-08-22
Payer: MEDICARE

## 2025-08-22 DIAGNOSIS — C79.9 METASTASIS FROM ESOPHAGEAL CANCER (MULTI): ICD-10-CM

## 2025-08-22 DIAGNOSIS — C15.9 METASTASIS FROM ESOPHAGEAL CANCER (MULTI): ICD-10-CM

## 2025-08-22 DIAGNOSIS — C15.5 MALIGNANT NEOPLASM OF LOWER THIRD OF ESOPHAGUS (MULTI): ICD-10-CM

## 2025-08-22 LAB
ALBUMIN SERPL BCP-MCNC: 3.7 G/DL (ref 3.4–5)
ALP SERPL-CCNC: 128 U/L (ref 33–136)
ALT SERPL W P-5'-P-CCNC: 107 U/L (ref 10–52)
ANION GAP SERPL CALC-SCNC: 10 MMOL/L (ref 10–20)
AST SERPL W P-5'-P-CCNC: 74 U/L (ref 9–39)
BASOPHILS # BLD AUTO: 0.03 X10*3/UL (ref 0–0.1)
BASOPHILS NFR BLD AUTO: 1.1 %
BILIRUB SERPL-MCNC: 0.6 MG/DL (ref 0–1.2)
BUN SERPL-MCNC: 23 MG/DL (ref 6–23)
CALCIUM SERPL-MCNC: 9.2 MG/DL (ref 8.6–10.3)
CHLORIDE SERPL-SCNC: 100 MMOL/L (ref 98–107)
CO2 SERPL-SCNC: 31 MMOL/L (ref 21–32)
CREAT SERPL-MCNC: 1.06 MG/DL (ref 0.5–1.3)
EGFRCR SERPLBLD CKD-EPI 2021: 73 ML/MIN/1.73M*2
EOSINOPHIL # BLD AUTO: 0.05 X10*3/UL (ref 0–0.4)
EOSINOPHIL NFR BLD AUTO: 1.8 %
ERYTHROCYTE [DISTWIDTH] IN BLOOD BY AUTOMATED COUNT: 16.5 % (ref 11.5–14.5)
GLUCOSE SERPL-MCNC: 115 MG/DL (ref 74–99)
HCT VFR BLD AUTO: 34.4 % (ref 41–52)
HGB BLD-MCNC: 11.1 G/DL (ref 13.5–17.5)
IMM GRANULOCYTES # BLD AUTO: 0 X10*3/UL (ref 0–0.5)
IMM GRANULOCYTES NFR BLD AUTO: 0 % (ref 0–0.9)
LYMPHOCYTES # BLD AUTO: 0.26 X10*3/UL (ref 0.8–3)
LYMPHOCYTES NFR BLD AUTO: 9.6 %
MCH RBC QN AUTO: 35.5 PG (ref 26–34)
MCHC RBC AUTO-ENTMCNC: 32.3 G/DL (ref 32–36)
MCV RBC AUTO: 110 FL (ref 80–100)
MONOCYTES # BLD AUTO: 0.21 X10*3/UL (ref 0.05–0.8)
MONOCYTES NFR BLD AUTO: 7.7 %
NEUTROPHILS # BLD AUTO: 2.17 X10*3/UL (ref 1.6–5.5)
NEUTROPHILS NFR BLD AUTO: 79.8 %
NRBC BLD-RTO: ABNORMAL /100{WBCS}
PLATELET # BLD AUTO: 198 X10*3/UL (ref 150–450)
POTASSIUM SERPL-SCNC: 4.1 MMOL/L (ref 3.5–5.3)
PROT SERPL-MCNC: 6.3 G/DL (ref 6.4–8.2)
RBC # BLD AUTO: 3.13 X10*6/UL (ref 4.5–5.9)
SODIUM SERPL-SCNC: 137 MMOL/L (ref 136–145)
WBC # BLD AUTO: 2.7 X10*3/UL (ref 4.4–11.3)

## 2025-08-22 PROCEDURE — 80053 COMPREHEN METABOLIC PANEL: CPT

## 2025-08-22 PROCEDURE — 85025 COMPLETE CBC W/AUTO DIFF WBC: CPT

## 2025-08-22 PROCEDURE — 36591 DRAW BLOOD OFF VENOUS DEVICE: CPT

## 2025-08-25 ENCOUNTER — APPOINTMENT (OUTPATIENT)
Dept: HEMATOLOGY/ONCOLOGY | Facility: CLINIC | Age: 75
End: 2025-08-25
Payer: MEDICARE

## 2025-08-25 ENCOUNTER — OFFICE VISIT (OUTPATIENT)
Dept: HEMATOLOGY/ONCOLOGY | Facility: CLINIC | Age: 75
End: 2025-08-25
Payer: MEDICARE

## 2025-08-25 ENCOUNTER — INFUSION (OUTPATIENT)
Dept: HEMATOLOGY/ONCOLOGY | Facility: CLINIC | Age: 75
End: 2025-08-25
Payer: MEDICARE

## 2025-08-25 ENCOUNTER — NUTRITION (OUTPATIENT)
Dept: HEMATOLOGY/ONCOLOGY | Facility: CLINIC | Age: 75
End: 2025-08-25

## 2025-08-25 ENCOUNTER — SOCIAL WORK (OUTPATIENT)
Dept: HEMATOLOGY/ONCOLOGY | Facility: CLINIC | Age: 75
End: 2025-08-25
Payer: MEDICARE

## 2025-08-25 VITALS — BODY MASS INDEX: 25.09 KG/M2 | WEIGHT: 179.23 LBS | HEIGHT: 71 IN

## 2025-08-25 VITALS
BODY MASS INDEX: 24.84 KG/M2 | SYSTOLIC BLOOD PRESSURE: 130 MMHG | HEART RATE: 72 BPM | WEIGHT: 179.23 LBS | TEMPERATURE: 97.2 F | DIASTOLIC BLOOD PRESSURE: 82 MMHG | OXYGEN SATURATION: 94 % | RESPIRATION RATE: 16 BRPM

## 2025-08-25 DIAGNOSIS — C15.5 MALIGNANT NEOPLASM OF LOWER THIRD OF ESOPHAGUS (MULTI): ICD-10-CM

## 2025-08-25 DIAGNOSIS — C15.9 METASTASIS FROM ESOPHAGEAL CANCER (MULTI): ICD-10-CM

## 2025-08-25 DIAGNOSIS — C79.9 METASTASIS FROM ESOPHAGEAL CANCER (MULTI): ICD-10-CM

## 2025-08-25 PROCEDURE — 96367 TX/PROPH/DG ADDL SEQ IV INF: CPT

## 2025-08-25 PROCEDURE — 96375 TX/PRO/DX INJ NEW DRUG ADDON: CPT | Mod: INF

## 2025-08-25 PROCEDURE — 96413 CHEMO IV INFUSION 1 HR: CPT

## 2025-08-25 PROCEDURE — 2500000004 HC RX 250 GENERAL PHARMACY W/ HCPCS (ALT 636 FOR OP/ED): Performed by: INTERNAL MEDICINE

## 2025-08-25 PROCEDURE — 2500000004 HC RX 250 GENERAL PHARMACY W/ HCPCS (ALT 636 FOR OP/ED): Mod: JW | Performed by: INTERNAL MEDICINE

## 2025-08-25 RX ORDER — DIPHENHYDRAMINE HYDROCHLORIDE 50 MG/ML
50 INJECTION, SOLUTION INTRAMUSCULAR; INTRAVENOUS AS NEEDED
Status: COMPLETED | OUTPATIENT
Start: 2025-08-25 | End: 2025-08-25

## 2025-08-25 RX ORDER — PROCHLORPERAZINE EDISYLATE 5 MG/ML
10 INJECTION INTRAMUSCULAR; INTRAVENOUS EVERY 6 HOURS PRN
Status: DISCONTINUED | OUTPATIENT
Start: 2025-08-25 | End: 2025-08-25 | Stop reason: HOSPADM

## 2025-08-25 RX ORDER — ALBUTEROL SULFATE 0.83 MG/ML
3 SOLUTION RESPIRATORY (INHALATION) AS NEEDED
Status: DISCONTINUED | OUTPATIENT
Start: 2025-08-25 | End: 2025-08-25 | Stop reason: HOSPADM

## 2025-08-25 RX ORDER — PROCHLORPERAZINE MALEATE 10 MG
10 TABLET ORAL EVERY 6 HOURS PRN
Status: DISCONTINUED | OUTPATIENT
Start: 2025-08-25 | End: 2025-08-25 | Stop reason: HOSPADM

## 2025-08-25 RX ORDER — EPINEPHRINE 0.3 MG/.3ML
0.3 INJECTION SUBCUTANEOUS EVERY 5 MIN PRN
Status: DISCONTINUED | OUTPATIENT
Start: 2025-08-25 | End: 2025-08-25 | Stop reason: HOSPADM

## 2025-08-25 RX ORDER — PALONOSETRON 0.05 MG/ML
0.25 INJECTION, SOLUTION INTRAVENOUS ONCE
Status: COMPLETED | OUTPATIENT
Start: 2025-08-25 | End: 2025-08-25

## 2025-08-25 RX ORDER — HEPARIN 100 UNIT/ML
500 SYRINGE INTRAVENOUS AS NEEDED
Status: CANCELLED | OUTPATIENT
Start: 2025-08-25

## 2025-08-25 RX ORDER — FAMOTIDINE 10 MG/ML
20 INJECTION, SOLUTION INTRAVENOUS ONCE AS NEEDED
Status: COMPLETED | OUTPATIENT
Start: 2025-08-25 | End: 2025-08-25

## 2025-08-25 RX ORDER — HEPARIN SODIUM,PORCINE/PF 10 UNIT/ML
50 SYRINGE (ML) INTRAVENOUS AS NEEDED
Status: CANCELLED | OUTPATIENT
Start: 2025-08-25

## 2025-08-25 RX ADMIN — DEXAMETHASONE SODIUM PHOSPHATE 12 MG: 10 INJECTION, SOLUTION INTRAMUSCULAR; INTRAVENOUS at 10:21

## 2025-08-25 RX ADMIN — ZOLBETUXIMAB 1600 MG: 20 INJECTION, POWDER, FOR SUSPENSION INTRAVENOUS at 11:49

## 2025-08-25 RX ADMIN — DIPHENHYDRAMINE HYDROCHLORIDE 50 MG: 50 INJECTION INTRAMUSCULAR; INTRAVENOUS at 13:30

## 2025-08-25 RX ADMIN — PALONOSETRON HYDROCHLORIDE 0.25 MG: 0.25 INJECTION INTRAVENOUS at 10:20

## 2025-08-25 RX ADMIN — FOSAPREPITANT 150 MG: 150 INJECTION, POWDER, LYOPHILIZED, FOR SOLUTION INTRAVENOUS at 10:59

## 2025-08-25 RX ADMIN — FAMOTIDINE 20 MG: 10 INJECTION INTRAVENOUS at 13:31

## 2025-08-25 RX ADMIN — METHYLPREDNISOLONE SODIUM SUCCINATE 40 MG: 40 INJECTION, POWDER, FOR SOLUTION INTRAMUSCULAR; INTRAVENOUS at 13:29

## 2025-08-25 ASSESSMENT — PAIN SCALES - GENERAL: PAINLEVEL_OUTOF10: 0-NO PAIN

## 2025-08-26 ENCOUNTER — INFUSION (OUTPATIENT)
Dept: HEMATOLOGY/ONCOLOGY | Facility: CLINIC | Age: 75
End: 2025-08-26
Payer: MEDICARE

## 2025-08-26 VITALS
SYSTOLIC BLOOD PRESSURE: 105 MMHG | DIASTOLIC BLOOD PRESSURE: 65 MMHG | WEIGHT: 182.54 LBS | RESPIRATION RATE: 16 BRPM | TEMPERATURE: 98.6 F | BODY MASS INDEX: 25.3 KG/M2 | OXYGEN SATURATION: 95 % | HEART RATE: 102 BPM

## 2025-08-26 DIAGNOSIS — C15.5 MALIGNANT NEOPLASM OF LOWER THIRD OF ESOPHAGUS (MULTI): ICD-10-CM

## 2025-08-26 DIAGNOSIS — C79.9 METASTASIS FROM ESOPHAGEAL CANCER (MULTI): ICD-10-CM

## 2025-08-26 DIAGNOSIS — C15.9 METASTASIS FROM ESOPHAGEAL CANCER (MULTI): ICD-10-CM

## 2025-08-26 PROCEDURE — 96413 CHEMO IV INFUSION 1 HR: CPT

## 2025-08-26 PROCEDURE — 96416 CHEMO PROLONG INFUSE W/PUMP: CPT

## 2025-08-26 PROCEDURE — 96415 CHEMO IV INFUSION ADDL HR: CPT

## 2025-08-26 PROCEDURE — 2500000004 HC RX 250 GENERAL PHARMACY W/ HCPCS (ALT 636 FOR OP/ED): Performed by: INTERNAL MEDICINE

## 2025-08-26 PROCEDURE — 96375 TX/PRO/DX INJ NEW DRUG ADDON: CPT | Mod: INF

## 2025-08-26 RX ORDER — EPINEPHRINE 0.3 MG/.3ML
0.3 INJECTION SUBCUTANEOUS EVERY 5 MIN PRN
Status: DISCONTINUED | OUTPATIENT
Start: 2025-08-26 | End: 2025-08-26 | Stop reason: HOSPADM

## 2025-08-26 RX ORDER — HEPARIN 100 UNIT/ML
500 SYRINGE INTRAVENOUS AS NEEDED
Status: CANCELLED | OUTPATIENT
Start: 2025-08-26

## 2025-08-26 RX ORDER — PROCHLORPERAZINE MALEATE 10 MG
10 TABLET ORAL EVERY 6 HOURS PRN
Status: DISCONTINUED | OUTPATIENT
Start: 2025-08-26 | End: 2025-08-26 | Stop reason: HOSPADM

## 2025-08-26 RX ORDER — LORAZEPAM 2 MG/ML
1 INJECTION INTRAMUSCULAR AS NEEDED
Status: DISCONTINUED | OUTPATIENT
Start: 2025-08-26 | End: 2025-08-26 | Stop reason: HOSPADM

## 2025-08-26 RX ORDER — DIPHENHYDRAMINE HYDROCHLORIDE 50 MG/ML
50 INJECTION, SOLUTION INTRAMUSCULAR; INTRAVENOUS AS NEEDED
Status: DISCONTINUED | OUTPATIENT
Start: 2025-08-26 | End: 2025-08-26 | Stop reason: HOSPADM

## 2025-08-26 RX ORDER — ALBUTEROL SULFATE 0.83 MG/ML
3 SOLUTION RESPIRATORY (INHALATION) AS NEEDED
Status: DISCONTINUED | OUTPATIENT
Start: 2025-08-26 | End: 2025-08-26 | Stop reason: HOSPADM

## 2025-08-26 RX ORDER — FAMOTIDINE 10 MG/ML
20 INJECTION, SOLUTION INTRAVENOUS ONCE AS NEEDED
Status: DISCONTINUED | OUTPATIENT
Start: 2025-08-26 | End: 2025-08-26 | Stop reason: HOSPADM

## 2025-08-26 RX ORDER — PROCHLORPERAZINE EDISYLATE 5 MG/ML
10 INJECTION INTRAMUSCULAR; INTRAVENOUS EVERY 6 HOURS PRN
Status: DISCONTINUED | OUTPATIENT
Start: 2025-08-26 | End: 2025-08-26 | Stop reason: HOSPADM

## 2025-08-26 RX ORDER — HEPARIN SODIUM,PORCINE/PF 10 UNIT/ML
50 SYRINGE (ML) INTRAVENOUS AS NEEDED
Status: CANCELLED | OUTPATIENT
Start: 2025-08-26

## 2025-08-26 RX ADMIN — FLUOROURACIL 4850 MG: 50 INJECTION, SOLUTION INTRAVENOUS at 11:35

## 2025-08-26 RX ADMIN — DEXAMETHASONE SODIUM PHOSPHATE 12 MG: 10 INJECTION, SOLUTION INTRAMUSCULAR; INTRAVENOUS at 08:46

## 2025-08-26 RX ADMIN — OXALIPLATIN 175 MG: 5 INJECTION, SOLUTION INTRAVENOUS at 09:14

## 2025-08-26 ASSESSMENT — PAIN SCALES - GENERAL: PAINLEVEL_OUTOF10: 0-NO PAIN

## 2025-08-27 ENCOUNTER — APPOINTMENT (OUTPATIENT)
Dept: HEMATOLOGY/ONCOLOGY | Facility: CLINIC | Age: 75
End: 2025-08-27
Payer: MEDICARE

## 2025-08-28 ENCOUNTER — INFUSION (OUTPATIENT)
Dept: HEMATOLOGY/ONCOLOGY | Facility: CLINIC | Age: 75
End: 2025-08-28
Payer: MEDICARE

## 2025-08-28 ENCOUNTER — TELEPHONE (OUTPATIENT)
Dept: HEMATOLOGY/ONCOLOGY | Facility: HOSPITAL | Age: 75
End: 2025-08-28

## 2025-08-28 VITALS
TEMPERATURE: 97.2 F | DIASTOLIC BLOOD PRESSURE: 75 MMHG | BODY MASS INDEX: 25.3 KG/M2 | RESPIRATION RATE: 16 BRPM | HEART RATE: 84 BPM | OXYGEN SATURATION: 97 % | WEIGHT: 182.54 LBS | SYSTOLIC BLOOD PRESSURE: 126 MMHG

## 2025-08-28 DIAGNOSIS — C15.5 MALIGNANT NEOPLASM OF LOWER THIRD OF ESOPHAGUS (MULTI): ICD-10-CM

## 2025-08-28 DIAGNOSIS — C15.9 METASTASIS FROM ESOPHAGEAL CANCER (MULTI): ICD-10-CM

## 2025-08-28 DIAGNOSIS — R11.2 CHEMOTHERAPY INDUCED NAUSEA AND VOMITING: ICD-10-CM

## 2025-08-28 DIAGNOSIS — K52.1 DIARRHEA DUE TO DRUG: Primary | ICD-10-CM

## 2025-08-28 DIAGNOSIS — T45.1X5A CHEMOTHERAPY INDUCED NAUSEA AND VOMITING: ICD-10-CM

## 2025-08-28 DIAGNOSIS — C79.9 METASTASIS FROM ESOPHAGEAL CANCER (MULTI): ICD-10-CM

## 2025-08-28 LAB
ALBUMIN SERPL BCP-MCNC: 3.3 G/DL (ref 3.4–5)
ALP SERPL-CCNC: 83 U/L (ref 33–136)
ALT SERPL W P-5'-P-CCNC: 40 U/L (ref 10–52)
ANION GAP SERPL CALC-SCNC: 12 MMOL/L (ref 10–20)
AST SERPL W P-5'-P-CCNC: 26 U/L (ref 9–39)
BILIRUB SERPL-MCNC: 0.4 MG/DL (ref 0–1.2)
BUN SERPL-MCNC: 48 MG/DL (ref 6–23)
CALCIUM SERPL-MCNC: 8.1 MG/DL (ref 8.6–10.3)
CHLORIDE SERPL-SCNC: 97 MMOL/L (ref 98–107)
CO2 SERPL-SCNC: 27 MMOL/L (ref 21–32)
CREAT SERPL-MCNC: 1.1 MG/DL (ref 0.5–1.3)
EGFRCR SERPLBLD CKD-EPI 2021: 70 ML/MIN/1.73M*2
GLUCOSE SERPL-MCNC: 127 MG/DL (ref 74–99)
MAGNESIUM SERPL-MCNC: 2.4 MG/DL (ref 1.6–2.4)
POTASSIUM SERPL-SCNC: 4 MMOL/L (ref 3.5–5.3)
PROT SERPL-MCNC: 5.9 G/DL (ref 6.4–8.2)
SODIUM SERPL-SCNC: 132 MMOL/L (ref 136–145)

## 2025-08-28 PROCEDURE — 2500000004 HC RX 250 GENERAL PHARMACY W/ HCPCS (ALT 636 FOR OP/ED): Performed by: INTERNAL MEDICINE

## 2025-08-28 PROCEDURE — 84075 ASSAY ALKALINE PHOSPHATASE: CPT

## 2025-08-28 PROCEDURE — 96360 HYDRATION IV INFUSION INIT: CPT | Mod: INF

## 2025-08-28 PROCEDURE — 83735 ASSAY OF MAGNESIUM: CPT

## 2025-08-28 RX ORDER — DIPHENOXYLATE HYDROCHLORIDE AND ATROPINE SULFATE 2.5; .025 MG/5ML; MG/5ML
5 SOLUTION ORAL 4 TIMES DAILY PRN
Qty: 60 ML | Refills: 3 | Status: SHIPPED | OUTPATIENT
Start: 2025-08-28 | End: 2025-08-28

## 2025-08-28 RX ORDER — DIPHENOXYLATE HYDROCHLORIDE AND ATROPINE SULFATE 2.5; .025 MG/1; MG/1
1 TABLET ORAL 4 TIMES DAILY PRN
Qty: 30 TABLET | Refills: 3 | Status: SHIPPED | OUTPATIENT
Start: 2025-08-28

## 2025-08-28 RX ORDER — HEPARIN 100 UNIT/ML
500 SYRINGE INTRAVENOUS AS NEEDED
OUTPATIENT
Start: 2025-08-28

## 2025-08-28 RX ORDER — HEPARIN SODIUM,PORCINE/PF 10 UNIT/ML
50 SYRINGE (ML) INTRAVENOUS AS NEEDED
OUTPATIENT
Start: 2025-08-28

## 2025-08-28 RX ORDER — OLANZAPINE 5 MG/1
5 TABLET, ORALLY DISINTEGRATING ORAL NIGHTLY
Qty: 20 TABLET | Refills: 2 | Status: SHIPPED | OUTPATIENT
Start: 2025-08-28

## 2025-08-28 RX ADMIN — SODIUM CHLORIDE 1000 ML: 0.9 INJECTION, SOLUTION INTRAVENOUS at 10:27

## 2025-08-28 ASSESSMENT — PAIN SCALES - GENERAL: PAINLEVEL_OUTOF10: 0-NO PAIN

## 2025-08-29 ENCOUNTER — HOME CARE VISIT (OUTPATIENT)
Dept: HOME HEALTH SERVICES | Facility: HOME HEALTH | Age: 75
End: 2025-08-29
Payer: MEDICARE

## 2025-08-29 VITALS
HEART RATE: 71 BPM | DIASTOLIC BLOOD PRESSURE: 78 MMHG | OXYGEN SATURATION: 98 % | RESPIRATION RATE: 18 BRPM | TEMPERATURE: 97.9 F | SYSTOLIC BLOOD PRESSURE: 122 MMHG

## 2025-08-29 PROCEDURE — G0299 HHS/HOSPICE OF RN EA 15 MIN: HCPCS

## 2025-08-29 ASSESSMENT — ENCOUNTER SYMPTOMS
DENIES PAIN: 1
PERSON REPORTING PAIN: PATIENT

## 2025-08-29 ASSESSMENT — ACTIVITIES OF DAILY LIVING (ADL)
OASIS_M1830: 00
HOME_HEALTH_OASIS: 00

## 2025-09-02 ASSESSMENT — ENCOUNTER SYMPTOMS
TROUBLE SWALLOWING: 1
PSYCHIATRIC NEGATIVE: 1
ENDOCRINE NEGATIVE: 1
RESPIRATORY NEGATIVE: 1
MUSCULOSKELETAL NEGATIVE: 1
GASTROINTESTINAL NEGATIVE: 1
HEMATOLOGIC/LYMPHATIC NEGATIVE: 1
EYES NEGATIVE: 1
CONSTITUTIONAL NEGATIVE: 1
NEUROLOGICAL NEGATIVE: 1
CARDIOVASCULAR NEGATIVE: 1

## 2025-09-05 ENCOUNTER — INFUSION (OUTPATIENT)
Dept: HEMATOLOGY/ONCOLOGY | Facility: CLINIC | Age: 75
End: 2025-09-05
Payer: MEDICARE

## 2025-09-05 ENCOUNTER — OFFICE VISIT (OUTPATIENT)
Dept: HEMATOLOGY/ONCOLOGY | Facility: CLINIC | Age: 75
End: 2025-09-05
Payer: MEDICARE

## 2025-09-05 ENCOUNTER — APPOINTMENT (OUTPATIENT)
Dept: GASTROENTEROLOGY | Facility: CLINIC | Age: 75
End: 2025-09-05
Payer: MEDICARE

## 2025-09-05 VITALS
DIASTOLIC BLOOD PRESSURE: 67 MMHG | RESPIRATION RATE: 18 BRPM | WEIGHT: 183.42 LBS | BODY MASS INDEX: 25.42 KG/M2 | SYSTOLIC BLOOD PRESSURE: 106 MMHG | HEART RATE: 92 BPM | TEMPERATURE: 98.1 F | OXYGEN SATURATION: 96 %

## 2025-09-05 DIAGNOSIS — C15.9 METASTASIS FROM ESOPHAGEAL CANCER (MULTI): ICD-10-CM

## 2025-09-05 DIAGNOSIS — C79.9 METASTASIS FROM ESOPHAGEAL CANCER (MULTI): ICD-10-CM

## 2025-09-05 DIAGNOSIS — D45 POLYCYTHEMIA VERA: ICD-10-CM

## 2025-09-05 DIAGNOSIS — C15.5 MALIGNANT NEOPLASM OF LOWER THIRD OF ESOPHAGUS (MULTI): ICD-10-CM

## 2025-09-05 DIAGNOSIS — I10 ESSENTIAL HYPERTENSION: ICD-10-CM

## 2025-09-05 DIAGNOSIS — C15.5 MALIGNANT NEOPLASM OF LOWER THIRD OF ESOPHAGUS (MULTI): Primary | ICD-10-CM

## 2025-09-05 DIAGNOSIS — E78.2 MIXED HYPERLIPIDEMIA: ICD-10-CM

## 2025-09-05 DIAGNOSIS — I82.509 CHRONIC DEEP VEIN THROMBOSIS (DVT) OF LOWER EXTREMITY, UNSPECIFIED LATERALITY, UNSPECIFIED VEIN: ICD-10-CM

## 2025-09-05 LAB
ALBUMIN SERPL BCP-MCNC: 2.9 G/DL (ref 3.4–5)
ALP SERPL-CCNC: 118 U/L (ref 33–136)
ALT SERPL W P-5'-P-CCNC: 51 U/L (ref 10–52)
ANION GAP SERPL CALC-SCNC: 8 MMOL/L (ref 10–20)
AST SERPL W P-5'-P-CCNC: 36 U/L (ref 9–39)
BASOPHILS # BLD AUTO: 0.04 X10*3/UL (ref 0–0.1)
BASOPHILS NFR BLD AUTO: 8.5 %
BILIRUB SERPL-MCNC: 0.5 MG/DL (ref 0–1.2)
BUN SERPL-MCNC: 21 MG/DL (ref 6–23)
CALCIUM SERPL-MCNC: 8 MG/DL (ref 8.6–10.3)
CHLORIDE SERPL-SCNC: 98 MMOL/L (ref 98–107)
CO2 SERPL-SCNC: 28 MMOL/L (ref 21–32)
CREAT SERPL-MCNC: 0.99 MG/DL (ref 0.5–1.3)
EGFRCR SERPLBLD CKD-EPI 2021: 79 ML/MIN/1.73M*2
EOSINOPHIL # BLD AUTO: 0 X10*3/UL (ref 0–0.4)
EOSINOPHIL NFR BLD AUTO: 0 %
ERYTHROCYTE [DISTWIDTH] IN BLOOD BY AUTOMATED COUNT: 17 % (ref 11.5–14.5)
GLUCOSE SERPL-MCNC: 155 MG/DL (ref 74–99)
HCT VFR BLD AUTO: 36 % (ref 41–52)
HGB BLD-MCNC: 11.7 G/DL (ref 13.5–17.5)
IMM GRANULOCYTES # BLD AUTO: 0.01 X10*3/UL (ref 0–0.5)
IMM GRANULOCYTES NFR BLD AUTO: 2.1 % (ref 0–0.9)
LYMPHOCYTES # BLD AUTO: 0.17 X10*3/UL (ref 0.8–3)
LYMPHOCYTES NFR BLD AUTO: 36.2 %
MCH RBC QN AUTO: 33.9 PG (ref 26–34)
MCHC RBC AUTO-ENTMCNC: 32.5 G/DL (ref 32–36)
MCV RBC AUTO: 104 FL (ref 80–100)
MONOCYTES # BLD AUTO: 0.22 X10*3/UL (ref 0.05–0.8)
MONOCYTES NFR BLD AUTO: 46.8 %
NEUTROPHILS # BLD AUTO: 0.03 X10*3/UL (ref 1.6–5.5)
NEUTROPHILS NFR BLD AUTO: 6.4 %
PLATELET # BLD AUTO: 199 X10*3/UL (ref 150–450)
POTASSIUM SERPL-SCNC: 4.2 MMOL/L (ref 3.5–5.3)
PROT SERPL-MCNC: 5 G/DL (ref 6.4–8.2)
RBC # BLD AUTO: 3.45 X10*6/UL (ref 4.5–5.9)
SODIUM SERPL-SCNC: 130 MMOL/L (ref 136–145)
WBC # BLD AUTO: 0.5 X10*3/UL (ref 4.4–11.3)

## 2025-09-05 PROCEDURE — G2211 COMPLEX E/M VISIT ADD ON: HCPCS | Performed by: INTERNAL MEDICINE

## 2025-09-05 PROCEDURE — 1111F DSCHRG MED/CURRENT MED MERGE: CPT | Performed by: INTERNAL MEDICINE

## 2025-09-05 PROCEDURE — 85025 COMPLETE CBC W/AUTO DIFF WBC: CPT

## 2025-09-05 PROCEDURE — 1160F RVW MEDS BY RX/DR IN RCRD: CPT | Performed by: INTERNAL MEDICINE

## 2025-09-05 PROCEDURE — 99214 OFFICE O/P EST MOD 30 MIN: CPT | Performed by: INTERNAL MEDICINE

## 2025-09-05 PROCEDURE — 1159F MED LIST DOCD IN RCRD: CPT | Performed by: INTERNAL MEDICINE

## 2025-09-05 PROCEDURE — 80053 COMPREHEN METABOLIC PANEL: CPT

## 2025-09-05 PROCEDURE — 36591 DRAW BLOOD OFF VENOUS DEVICE: CPT

## 2025-09-05 RX ORDER — HEPARIN 100 UNIT/ML
500 SYRINGE INTRAVENOUS AS NEEDED
OUTPATIENT
Start: 2025-09-05

## 2025-09-05 RX ORDER — HEPARIN SODIUM,PORCINE/PF 10 UNIT/ML
50 SYRINGE (ML) INTRAVENOUS AS NEEDED
Status: CANCELLED | OUTPATIENT
Start: 2025-09-05

## 2025-09-05 RX ORDER — HEPARIN 100 UNIT/ML
500 SYRINGE INTRAVENOUS AS NEEDED
Status: CANCELLED | OUTPATIENT
Start: 2025-09-05

## 2025-09-05 RX ORDER — HEPARIN SODIUM,PORCINE/PF 10 UNIT/ML
50 SYRINGE (ML) INTRAVENOUS AS NEEDED
OUTPATIENT
Start: 2025-09-05

## 2025-09-05 ASSESSMENT — PAIN SCALES - GENERAL: PAINLEVEL_OUTOF10: 0-NO PAIN

## 2025-09-06 ASSESSMENT — ENCOUNTER SYMPTOMS
HEMATOLOGIC/LYMPHATIC NEGATIVE: 1
ENDOCRINE NEGATIVE: 1
EYES NEGATIVE: 1
NEUROLOGICAL NEGATIVE: 1
PSYCHIATRIC NEGATIVE: 1
GASTROINTESTINAL NEGATIVE: 1
MUSCULOSKELETAL NEGATIVE: 1
TROUBLE SWALLOWING: 1
RESPIRATORY NEGATIVE: 1
CARDIOVASCULAR NEGATIVE: 1
FATIGUE: 1

## 2025-09-08 ENCOUNTER — APPOINTMENT (OUTPATIENT)
Dept: HEMATOLOGY/ONCOLOGY | Facility: CLINIC | Age: 75
End: 2025-09-08
Payer: MEDICARE

## (undated) DEVICE — SYRINGE, 10 CC, LUER LOCK

## (undated) DEVICE — SYRINGE, 10 CC, SLIP TIP

## (undated) DEVICE — GLOVE, SURGICAL, PROTEXIS PI , 7.5, PF, LF

## (undated) DEVICE — GLOVE, SURGICAL, PROTEXIS NEOPRENE, 8.0, PF, LF

## (undated) DEVICE — Device

## (undated) DEVICE — DRESSING, GAUZE, SPONGE, 8 PLY, CURITY, 4 X 4, LF

## (undated) DEVICE — NEEDLE, ECLIPSE, 25GA X 1-1/2 IN

## (undated) DEVICE — DRESSING, GAUZE, SPONGE, 12 PLY, 4 X 4 IN, PLASTIC POUCH, STRL 10PK

## (undated) DEVICE — SUTURE, MONOCRYL, 4-0, 27 IN, PS-2, UNDYED

## (undated) DEVICE — NEEDLE, SAFETY, 18 G X 1.5 IN

## (undated) DEVICE — DRAPE, SHEET, LAPAROTOMY, W/ISO-BAC, W/ARMBOARD COVERS, 98 X 122 IN, DISPOSABLE, LF, STERILE

## (undated) DEVICE — DRAPE, C-ARM IMAGE

## (undated) DEVICE — TOWEL PACK, STERILE, 16X24, XRAY DETECTABLE, BLUE, 4/PK

## (undated) DEVICE — ELECTRODE, ELECTROSURGICAL, BLADE, INSULATED, ENT/IMA, STERILE

## (undated) DEVICE — SOLUTION, IRRIGATION, 0.9% SODIUM CHLORIDE, 1000 ML, HANG BOTTLE

## (undated) DEVICE — PEG KIT, ENDOVIVE, STD, PULL, 20FR, W/ENFIT

## (undated) DEVICE — STRAP, ARM BOARD, 32 X 1.5

## (undated) DEVICE — DRAPE, INCISE, ANTIMICROBIAL, IOBAN 2, LARGE, 17 X 23 IN, DISPOSABLE, STERILE

## (undated) DEVICE — STRAP, VELCRO, BODY, 4 X 60IN, NS

## (undated) DEVICE — GLOVE, SURGICAL, PROTEXIS PI BLUE W/NEUTHERA, 8.0, PF, LF

## (undated) DEVICE — NEEDLE, SAFETY, 25 GA X 1.5 IN

## (undated) DEVICE — CAUTERY, PENCIL, PUSH BUTTON, SMOKE EVAC, 70MM

## (undated) DEVICE — SUTURE, PROLENE, 3-0, 36 IN, RB1, DA, BLUE

## (undated) DEVICE — PROBE COVER, ULTRASOUND, 6 X 48

## (undated) DEVICE — APPLICATOR, CHLORAPREP, W/ORANGE TINT, 26ML

## (undated) DEVICE — SUTURE, VICRYL PLUS 3-0, SH, 27IN

## (undated) DEVICE — CUP, MEDICINE, GRADUATED, 2 OZ, PLASTIC, DISP, LF

## (undated) DEVICE — BANDAGE, ADHESIVE, FLEXIBLE FABRIC, 1 X 3

## (undated) DEVICE — ADHESIVE, SKIN, DERMABOND ADVANCED, 15CM, PEN-STYLE

## (undated) DEVICE — GEL, ECOVUE, ULTRASOUND, 20GRAM, STERILE

## (undated) DEVICE — DRAPE, SHEET, THREE QUARTER, FAN FOLD, 57 X 77 IN

## (undated) DEVICE — TUBING, SUCTION, 6MM X 10, CLEAN N-COND

## (undated) DEVICE — GLOVE, SURGICAL, PROTEXIS PI , 7.0, PF, LF

## (undated) DEVICE — SYRINGE, 5 CC, LUER LOCK

## (undated) DEVICE — LABEL KIT, MEDICATION, OR EMC, STERILE

## (undated) DEVICE — SOLUTION, IRRIGATION, STERILE WATER, 1000 ML, HANG BOTTLE

## (undated) DEVICE — MARKER, SKIN, W/ RULER, LF, STERILE